# Patient Record
Sex: FEMALE | Race: WHITE | Employment: FULL TIME | URBAN - NONMETROPOLITAN AREA
[De-identification: names, ages, dates, MRNs, and addresses within clinical notes are randomized per-mention and may not be internally consistent; named-entity substitution may affect disease eponyms.]

---

## 2019-12-09 ENCOUNTER — OFFICE VISIT (OUTPATIENT)
Dept: CARDIOLOGY CLINIC | Age: 49
End: 2019-12-09
Payer: MEDICARE

## 2019-12-09 VITALS
HEART RATE: 78 BPM | DIASTOLIC BLOOD PRESSURE: 92 MMHG | SYSTOLIC BLOOD PRESSURE: 142 MMHG | RESPIRATION RATE: 16 BRPM | HEIGHT: 64 IN | BODY MASS INDEX: 35.85 KG/M2 | WEIGHT: 210 LBS

## 2019-12-09 DIAGNOSIS — R01.1 CARDIAC MURMUR: ICD-10-CM

## 2019-12-09 DIAGNOSIS — I35.1 NONRHEUMATIC AORTIC VALVE INSUFFICIENCY: Primary | ICD-10-CM

## 2019-12-09 DIAGNOSIS — R00.2 PALPITATIONS: ICD-10-CM

## 2019-12-09 PROCEDURE — G8427 DOCREV CUR MEDS BY ELIG CLIN: HCPCS | Performed by: INTERNAL MEDICINE

## 2019-12-09 PROCEDURE — 4004F PT TOBACCO SCREEN RCVD TLK: CPT | Performed by: INTERNAL MEDICINE

## 2019-12-09 PROCEDURE — G8484 FLU IMMUNIZE NO ADMIN: HCPCS | Performed by: INTERNAL MEDICINE

## 2019-12-09 PROCEDURE — 99214 OFFICE O/P EST MOD 30 MIN: CPT | Performed by: INTERNAL MEDICINE

## 2019-12-09 PROCEDURE — G8417 CALC BMI ABV UP PARAM F/U: HCPCS | Performed by: INTERNAL MEDICINE

## 2019-12-09 RX ORDER — HYDROCHLOROTHIAZIDE 25 MG/1
25 TABLET ORAL DAILY
Qty: 90 TABLET | Refills: 3 | Status: SHIPPED | OUTPATIENT
Start: 2019-12-09

## 2019-12-20 ENCOUNTER — TELEPHONE (OUTPATIENT)
Dept: CARDIOLOGY CLINIC | Age: 49
End: 2019-12-20

## 2022-01-21 ENCOUNTER — OFFICE VISIT (OUTPATIENT)
Dept: NEUROLOGY | Age: 52
End: 2022-01-21
Payer: MEDICARE

## 2022-01-21 VITALS
HEIGHT: 63 IN | SYSTOLIC BLOOD PRESSURE: 140 MMHG | HEART RATE: 82 BPM | DIASTOLIC BLOOD PRESSURE: 88 MMHG | WEIGHT: 218.25 LBS | BODY MASS INDEX: 38.67 KG/M2 | OXYGEN SATURATION: 96 %

## 2022-01-21 DIAGNOSIS — G50.0 TRIGEMINAL NEURALGIA OF LEFT SIDE OF FACE: Primary | ICD-10-CM

## 2022-01-21 DIAGNOSIS — R01.1 MURMUR, CARDIAC: ICD-10-CM

## 2022-01-21 DIAGNOSIS — I35.1 MILD AORTIC REGURGITATION: ICD-10-CM

## 2022-01-21 DIAGNOSIS — F17.200 SMOKER: ICD-10-CM

## 2022-01-21 DIAGNOSIS — E66.9 OBESITY (BMI 30-39.9): ICD-10-CM

## 2022-01-21 DIAGNOSIS — R00.2 PALPITATIONS: ICD-10-CM

## 2022-01-21 DIAGNOSIS — H81.09 MENIERE'S DISEASE, UNSPECIFIED LATERALITY: ICD-10-CM

## 2022-01-21 PROCEDURE — 99244 OFF/OP CNSLTJ NEW/EST MOD 40: CPT | Performed by: PSYCHIATRY & NEUROLOGY

## 2022-01-21 PROCEDURE — G8484 FLU IMMUNIZE NO ADMIN: HCPCS | Performed by: PSYCHIATRY & NEUROLOGY

## 2022-01-21 PROCEDURE — 99214 OFFICE O/P EST MOD 30 MIN: CPT | Performed by: PSYCHIATRY & NEUROLOGY

## 2022-01-21 PROCEDURE — G8417 CALC BMI ABV UP PARAM F/U: HCPCS | Performed by: PSYCHIATRY & NEUROLOGY

## 2022-01-21 PROCEDURE — G8427 DOCREV CUR MEDS BY ELIG CLIN: HCPCS | Performed by: PSYCHIATRY & NEUROLOGY

## 2022-01-21 RX ORDER — CARBAMAZEPINE 200 MG/1
CAPSULE, EXTENDED RELEASE ORAL
COMMUNITY
Start: 2021-11-15 | End: 2022-02-17 | Stop reason: SDUPTHER

## 2022-01-21 ASSESSMENT — ENCOUNTER SYMPTOMS
CHEST TIGHTNESS: 0
SHORTNESS OF BREATH: 0
COLOR CHANGE: 0
VOICE CHANGE: 0
CHOKING: 0
PHOTOPHOBIA: 0
BACK PAIN: 0
EYE REDNESS: 0
ABDOMINAL PAIN: 0
NAUSEA: 0
TROUBLE SWALLOWING: 0
DIARRHEA: 0
ABDOMINAL DISTENTION: 0
VISUAL CHANGE: 0
EYE DISCHARGE: 0
FACIAL SWELLING: 0
APNEA: 0
EYE PAIN: 0
WHEEZING: 0
VOMITING: 0
SINUS PRESSURE: 0
EYE ITCHING: 0
SORE THROAT: 0
CONSTIPATION: 0
BLOOD IN STOOL: 0
COUGH: 0

## 2022-01-21 NOTE — PROGRESS NOTES
[facility]  Neurology    [address] [address]  BVUXP:[phone]   Fax: [phone]        SUBJECTIVE:         PATIENT ID:  Mata Ruiz is a  RIGHT    HANDED 54 y.o. female. Neurologic Problem  The patient's pertinent negatives include no altered mental status, clumsiness, focal sensory loss, focal weakness, loss of balance, memory loss, near-syncope, slurred speech, syncope, visual change or weakness. Primary symptoms comment: LEFT   TRIGEMINAL  NEURALGIA    IN  MAXILLARY    AREA   SINCE    2016  . This is a chronic problem. The neurological problem developed gradually. The problem has been waxing and waning since onset. There was no focality noted. Associated symptoms include dizziness. Pertinent negatives include no abdominal pain, auditory change, aura, back pain, bladder incontinence, chest pain, confusion, diaphoresis, fatigue, fever, headaches, light-headedness, nausea, neck pain, palpitations, shortness of breath, vertigo or vomiting. Treatments tried: TEGRETAL  The treatment provided moderate relief. There is no history of a bleeding disorder, a clotting disorder, a CVA, dementia, head trauma, liver disease, mood changes or seizures. History obtained from  The   PATIENT         and other  available   medical  records   were  Also  reviewed. The  Duration,  Quality,  Severity,  Location,  Timing,  Context,  Modifying  Factors   Of   The   Chief   Complaint       And  Present  Illness  Was   Reviewed   In   Chronological   Manner.                                             PATIENT'S  MAIN  CONCERNS INCLUDE :                       1)     H/O     TRIGEMINAL  NEURALGIA     ON  LEFT     FACE                                       MAXILLARY  DIVISION      SINCE   2016                                      PREVIOUS    H/O    DENTAL    EXTRACTION    IN   2016                                   AND    ALSO   HAD   FOLLOW   UP 2)     NO     H/O   TRAUMA    TO   FACE /  HEAD  AND  NECK  . 3)     PATIENT      HAD   NEUROLOGY   EVALUATIONS      IN                                   [facility]         AND     BEING       ON                                       TEGRETOL    XR    400    MG   BID      SINCE     2017                        4)      HAD    MRI   EVALUATIONS     IN    SEPT. 2016                                AND   IN    OCT. 2020       WITH   AND  W/O   CONTRAST                                    SHOWED   NO  SIGNIFICANT   ABNORMALITIES                                   -    REPORTS     REVIEWED       ON      1/21/2022                          5)       PATIENT     HAS   CONTINUED      SYMPTOMS   OF                                 HYPERSENSITIVE    AND  BURNING     SENSATION                                    LEFT    LOWER   MAXILLARY   AREA                                      6)    H/O     CHRONIC  SMOKING                    PATIENT  AWARE  OF  RISKS  AND                 SIDE  EFFECTS  OF   SMOKING   DISCUSSED. PATIENT   ADVISED   AND  COUNSELED    TO   QUIT  SMOKING.                    7)       PREVIOUS     H/O   PALPITATIONS,    CARDIAC   MURMUR                               AND  MILD    AORTIC     REGURGITATION                                     -   BEING       FOLLOWED  BY    CARDIOLOGY                         8)      H/O     EPISODIC   VERTIGO      AND  MENIERE'S   DISEASE                                    HAD     ENT      EVALUATIONS                                        -   ON      HYDROCHLOROTHIAZIDE                                                  9)       OBESITY                        10)     PATIENT   WANTED    TO    ESTABLISH     WITH   LOCAL  NEUROLOGY                                  SERVICES                                            11)     IN  VIEW  OF  THE  PATIENT'S    MULTIPLE   NEUROLOGICAL                           SYMPTOMS  AND History  Of   Recent  TIA     absent             History  Of   Recent    Stroke     absent             Neck  Pain   and   Neck muscle  Spasms  absent               Radiating  down   And   Weakness           absent            Lower back   Pain  And     Spasms  absent              Radiating    Down   And   Weakness          absent                H/OFALLS        absent               History  Of   Visual  Symptoms    absent                  Associated   Diplopia       absent                                               Also   Additional   Symptoms   Present    As  Documented    In   The   detailed                  Review  Of  Systems   And    Please   Refer   To    Them for   Additional    Information. Any components  That are either  Unobtainable  Or  Limited  In   HPI, ROS  And/or PFSH   Are                   Due   ToPatient's  Medical  Problems,  Clinical  Condition   and/or lack of                                 other    Alternate   resources.             RECORDS   REVIEWED:    historical medical records           INFORMATION   REVIEWED:     MEDICAL   HISTORY,SURGICAL   HISTORY,     MEDICATIONS   LIST,   ALLERGIES AND  DRUG  INTOLERANCES,       FAMILY   HISTORY,  SOCIAL  HISTORY,      PROBLEM  LIST   FOR  PATIENT  CARE   COORDINATION          Past Medical History:   Diagnosis Date    Meniere's disease     Mild aortic regurgitation     with aortic insufficiency on echo 7/8/15    Murmur, cardiac     Palpitations     Temporomandibular joint disorder (TMJ)          Past Surgical History:   Procedure Laterality Date    TUBAL LIGATION           Current Outpatient Medications   Medication Sig Dispense Refill    carBAMazepine (CARBATROL) 200 MG extended release capsule take 2 capsules by mouth twice a day      hydrochlorothiazide (HYDRODIURIL) 25 MG tablet Take 1 tablet by mouth daily For Meniere's disease not htn 90 tablet 3    ibuprofen (ADVIL;MOTRIN) 200 MG tablet Take 200 mg by mouth as needed for Pain       No current facility-administered medications for this visit. No Known Allergies      History reviewed. No pertinent family history. Social History     Socioeconomic History    Marital status:      Spouse name: Not on file    Number of children: Not on file    Years of education: Not on file    Highest education level: Not on file   Occupational History    Not on file   Tobacco Use    Smoking status: Current Every Day Smoker     Packs/day: 1.00     Years: 9.00     Pack years: 9.00    Smokeless tobacco: Never Used   Substance and Sexual Activity    Alcohol use: Not Currently    Drug use: No    Sexual activity: Not on file   Other Topics Concern    Not on file   Social History Narrative    Not on file     Social Determinants of Health     Financial Resource Strain:     Difficulty of Paying Living Expenses: Not on file   Food Insecurity:     Worried About Running Out of Food in the Last Year: Not on file    Gonzalo of Food in the Last Year: Not on file   Transportation Needs:     Lack of Transportation (Medical): Not on file    Lack of Transportation (Non-Medical):  Not on file   Physical Activity:     Days of Exercise per Week: Not on file    Minutes of Exercise per Session: Not on file   Stress:     Feeling of Stress : Not on file   Social Connections:     Frequency of Communication with Friends and Family: Not on file    Frequency of Social Gatherings with Friends and Family: Not on file    Attends Caodaism Services: Not on file    Active Member of Clubs or Organizations: Not on file    Attends Club or Organization Meetings: Not on file    Marital Status: Not on file   Intimate Partner Violence:     Fear of Current or Ex-Partner: Not on file    Emotionally Abused: Not on file    Physically Abused: Not on file    Sexually Abused: Not on file   Housing Stability:     Unable to Pay for Housing in the Last Year: Not on file    Number of Places Lived in the Last Year: Not on file    Unstable Housing in the Last Year: Not on file       Vitals:    01/21/22 1345   BP: (!) 140/88   Pulse: 82   SpO2: 96%         Wt Readings from Last 3 Encounters:   01/21/22 218 lb 4 oz (99 kg)   12/09/19 210 lb (95.3 kg)   10/27/16 188 lb (85.3 kg)         BP Readings from Last 3 Encounters:   01/21/22 (!) 140/88   12/09/19 (!) 142/92   10/27/16 140/80           Hematology and Coagulation    No results found for: WBC, RBC, HGB, HCT, MCV, MCH, MCHC, RDW, PLT, MPV    No results found for: ESR    Chemistries    No results found for: NA, K, CL, CO2, BUN, CREATININE, CALCIUM, PROT, LABALBU, BILITOT, ALKPHOS, AST, ALT  No results found for: ALKPHOS, ALT, AST, PROT, BILITOT, BILIDIR, LABALBU  No results found for: BUN, CREATININE  No results found for: CALCIUM, MG  No results found for: AST, ALT  No components found for: IYMJX8E  No results found for: URICACID  No results found for: CKTOTAL  No results found for: [id number]      Drug Level    No results found for: PHENYTOIN, VALPROATE    No results found. Review of Systems   Constitutional: Negative for appetite change, chills, diaphoresis, fatigue, fever and unexpected weight change. HENT: Negative for congestion, dental problem, drooling, ear discharge, ear pain, facial swelling, hearing loss, mouth sores, nosebleeds, postnasal drip, sinus pressure, sore throat, tinnitus, trouble swallowing and voice change. Eyes: Negative for photophobia, pain, discharge, redness, itching and visual disturbance. Respiratory: Negative for apnea, cough, choking, chest tightness, shortness of breath and wheezing. Cardiovascular: Negative for chest pain, palpitations, leg swelling and near-syncope. Gastrointestinal: Negative for abdominal distention, abdominal pain, blood in stool, constipation, diarrhea, nausea and vomiting. Endocrine: Negative for cold intolerance, heat intolerance, polydipsia, polyphagia and polyuria. Genitourinary: Negative for bladder incontinence. Musculoskeletal: Negative for arthralgias, back pain, gait problem, joint swelling, myalgias, neck pain and neck stiffness. Skin: Negative for color change, pallor, rash and wound. Allergic/Immunologic: Negative for environmental allergies, food allergies and immunocompromised state. Neurological: Positive for dizziness. Negative for vertigo, tremors, focal weakness, seizures, syncope, facial asymmetry, speech difficulty, weakness, light-headedness, numbness, headaches and loss of balance. EPISODE  OF  VERTIGO. LEFT   TRIGEMINAL  NEURALGIA    Hematological: Negative for adenopathy. Does not bruise/bleed easily. Psychiatric/Behavioral: Negative for agitation, behavioral problems, confusion, decreased concentration, dysphoric mood, hallucinations, memory loss, self-injury, sleep disturbance and suicidal ideas. The patient is not nervous/anxious and is not hyperactive. OBJECTIVE:      Physical Exam  Constitutional:       Appearance: She is well-developed. HENT:      Head: Normocephalic and atraumatic. No raccoon eyes or Berumen's sign. Right Ear: External ear normal.      Left Ear: External ear normal.      Nose: Nose normal.   Eyes:      Conjunctiva/sclera: Conjunctivae normal.      Pupils: Pupils are equal, round, and reactive to light. Neck:      Thyroid: No thyroid mass or thyromegaly. Vascular: No carotid bruit. Trachea: No tracheal deviation. Meningeal: Brudzinski's sign and Kernig's sign absent. Cardiovascular:      Rate and Rhythm: Normal rate and regular rhythm. Pulmonary:      Effort: Pulmonary effort is normal.   Musculoskeletal:         General: No tenderness. Normal range of motion. Cervical back: Normal range of motion and neck supple. No rigidity. No muscular tenderness. Normal range of motion. Skin:     General: Skin is warm. Coloration: Skin is not pale.       Findings: No erythema or rash.      Nails: There is no clubbing. Psychiatric:         Attention and Perception: She is attentive. Mood and Affect: Mood is not anxious or depressed. Affect is not labile, blunt or inappropriate. Speech: She is communicative. Speech is not rapid and pressured, delayed, slurred or tangential.         Behavior: Behavior is not agitated, slowed, aggressive, withdrawn, hyperactive or combative. Behavior is cooperative. Thought Content: Thought content is not paranoid or delusional. Thought content does not include homicidal or suicidal ideation. Thought content does not include homicidal or suicidal plan. Cognition and Memory: Memory is not impaired. She does not exhibit impaired recent memory or impaired remote memory. Judgment: Judgment is not impulsive or inappropriate. NEUROLOGICALEXAMINATION :       A) MENTAL STATUS:                   Alert and  oriented  To time, place  And  Person. No Aphasia. No  Dysarthria. Able   To  Follow     SIMPLE    commands   without   Any  Difficulty. No right  To left confusion. Normal  Speech  And language function. Insight and  Judgment ,Fund  Of  Knowledge   within normal limits                Recent  And  Remote memory  within   normal limits                Attention &  Concentration are within   normal limits                                                   B) CRANIAL NERVES :        CN : Visual  Acuity  And  Visual fields  within normal limits               Fundi  Could  Not  Be  Could  Not  Be  Evaluated. 3,4,6 CN : Both  Pupils are   Reactive and  Equal.  Movements  Are  Intact. No  Nystagmus. No  HAZEL. No  Afferent  Pupillary  Defect noted. 5 CN :  Normal  Facial sensations and Corneal  Reflexes           7 CN:  Normal  Facial  Symmetry  And  Strength. No facial  Weakness. HYPERSENSITIVITY   ON  LEFT  SIDE  OF  FACE    IN  MAXILLARY  AREA           8 CN :  Hearing  Appears within normal limits          9, 10 CN: Normal   spontaneous, reflex   palate   movements         11 CN:   Normal  Shoulder  shrug and  strength         12 CN :   Normal  Tongue movements and  Tongue  In midline                        No tongue   Fasciculations or atrophy       C) MOTOR  EXAM:                 Strength  In upper  And  Lower   extremities   within   normal limits               No  Drift. No  Atrophy               Rapid   alternating  And  repetitions  Movements  within   normal limits                 Muscle  Tone  In upper  And  Lower  Extremities  normal                No rigidity. No  Spasticity. Bradykinesia   absent                 No  Asterixis. Sustention  Tremor , Resting   Tremor   absent                    No   other  Abnormal  Movements noted           D) SENSORY :               Light   touch, pinprick,   position  And  Vibration  within normal limits        E) REFLEXES:                   Deep  Tendon  Reflexes   normal                  No  pathological  Reflexes  Bilaterally. F) COORDINATION  AND  GAIT :                                Station and  Gait  normal                              Romberg 's test negative                          Ataxia negative          ASSESSMENT:        Patient Active Problem List   Diagnosis    Murmur, cardiac    Mild aortic regurgitation    Palpitations    Meniere's disease    Temporomandibular joint disorder (TMJ)    Trigeminal neuralgia of left side of face    Smoker           VISITING DIAGNOSIS:      ICD-10-CM    1.  Trigeminal neuralgia of left side of face  G50.0 Sedimentation Rate     CBC     GORGE Screen with Reflex     Rheumatoid Factor     Comprehensive Metabolic Panel     Carbamazepine Level, Total     Lupus Anticoagulant     TSH     Vitamin B12 & Folate     T. pallidum Ab     MRI BRAIN W WO CONTRAST     XR CHEST STANDARD (2 VW)   2. Murmur, cardiac  R01.1 Sedimentation Rate     CBC     GORGE Screen with Reflex     Rheumatoid Factor     Comprehensive Metabolic Panel     Carbamazepine Level, Total     Lupus Anticoagulant     TSH     Vitamin B12 & Folate     T. pallidum Ab     MRI BRAIN W WO CONTRAST     XR CHEST STANDARD (2 VW)   3. Mild aortic regurgitation  I35.1 Sedimentation Rate     CBC     GORGE Screen with Reflex     Rheumatoid Factor     Comprehensive Metabolic Panel     Carbamazepine Level, Total     Lupus Anticoagulant     TSH     Vitamin B12 & Folate     T. pallidum Ab     MRI BRAIN W WO CONTRAST     XR CHEST STANDARD (2 VW)   4. Meniere's disease, unspecified laterality  H81.09 Sedimentation Rate     CBC     GORGE Screen with Reflex     Rheumatoid Factor     Comprehensive Metabolic Panel     Carbamazepine Level, Total     Lupus Anticoagulant     TSH     Vitamin B12 & Folate     T. pallidum Ab     MRI BRAIN W WO CONTRAST     XR CHEST STANDARD (2 VW)   5. Palpitations  R00.2 Sedimentation Rate     CBC     GORGE Screen with Reflex     Rheumatoid Factor     Comprehensive Metabolic Panel     Carbamazepine Level, Total     Lupus Anticoagulant     TSH     Vitamin B12 & Folate     T. pallidum Ab     MRI BRAIN W WO CONTRAST     XR CHEST STANDARD (2 VW)   6. Smoker  F17.200                         VARIOUS  RISK   FACTORS   WERE  REVIEWED   AND   DISCUSSED. PLAN:                         Sara Lamb  Of  The  Diagnoses,  The  Management & Treatment  Options            AND    Care  plan  Were          Reviewed and   Discussed   With  patient. * Goals  And  Expectations  Of  The  Therapy  Discussed   And  Reviewed. *   Benefits   And   Side  Effect  Profile  Of  Medication/s   Were   Discussed                * Need   For  Further   Follow up For  The  Various  Problems Were  discussed.           * Results  Of  The  Previous  Diagnostic tests were reviewed and discussed                   Medical  Decision  Making  Was  Made  Based on the   Complexity  Of  Above  Mentioned  Diagnoses,    Data reviewed             And    Risk  Of  Significant   Co morbidities and   complicating   Factors. Medical  Decision  Was    Moderate   Complexity   Due   To  The  Patient's  Multiple  Symptoms  &  Disease,            Complex  Treatment  Regimen,  Multiple medications           and   Risk  Of   Side  Effects,  Difficulty  In  Medication  Management  And  Diagnostic  Challenges           In  View  Of  The  Associated   Co  Morbid  Conditions   And  Problems. *   ADEQUATE   FLUID  INTAKE   AND  ELECTROLYTE  BALANCE           * KEEP  DAIRY  OF   THE  NEUROLOGICAL  SYMPTOMS        RECORDING THE    DURATION  AND  FREQUENCY. *  AVOID    CONDITIONS  AND  FACTORS   THAT  MAKE                  NEUROLOGICAL  SYMPTOMS  WORSE.                    *TO  MAINTAIN  REGULAR  SLEEP  WAKE  CYCLES. *   TO  HAVE  ADEQUATE  REST  AND   SLEEP    HOURS.          *    TO   AVOID   TO  SLEEP  IN   SUPINE  POSITION. *      WEIGHT   LOSS. *    AVOID  ANY USAGE OF    TOBACCO,          AVOID  EXCESSIVE  ALCOHOL  AND   ILLEGAL   SUBSTANCES          *  CONTINUE   MEDICATIONS    PRESCRIBED       AS    RECOMMENDED       *   Compliance   With  Medications   And  Instructions          * CURRENTLY    TOLERATING  THE  PRESCRIBED   MEDICATIONS. WITHOUT  ANY  SIGNIFICANT  SIDE  EFFECTS   &  GETTING BENEFIT.         *    Antiplatelet  therapy    As   Recommended  Was   Discussed      *    Prophylactic  Use   Of     Vitamin   B   Complex,  Folic  Acid,    Vitamin  B12    Multivitamin,       Calcium  With  magnesium  And  Vit D    Supplementations   Over  The  Counter  Discussed                *  EVALUATIONS  AND  FOLLOW UP:                       *CARDIOLOGY                 *   DENTAL                     * ENT *   IN  VIEW  OF  THE  PATIENT'S    MULTIPLE   NEUROLOGICAL                           SYMPTOMS  AND  CONCERNS    FOR  PROLONGED   DURATION,                           AND    MULTIPLE   CO MORBID  MEDICAL  CONDITIONS,                           PATIENT    NEEDS  NEURO  DIAGNOSTIC  EVALUATIONS                      FOR   ANY   NEUROLOGICAL  PATHOLOGIES    AND  OTHER                        CORRECTABLE   ETIOLOGIES;     AND                              PATIENT  REQUESTS   THE  SAME. Orders Placed This Encounter   Procedures    MRI BRAIN W WO CONTRAST    XR CHEST STANDARD (2 VW)    Sedimentation Rate    CBC    GORGE Screen with Reflex    Rheumatoid Factor    Comprehensive Metabolic Panel    Carbamazepine Level, Total    Lupus Anticoagulant    TSH    Vitamin B12 & Folate    T. pallidum Ab                           *  PATIENT  TO  RETURN  THE  CLINIC  AFTER   ABOVE,                       FOR   FURTHER    RE EVALUATIONS                               AND  ADDITIONAL  RECOMMENDATIONS               *PATIENT   TO  FOLLOW  UP  WITH   PRIMARY  CARE         OTHER  CONSULTANTS  AS  BEFORE.               *TO  FOLLOW  WITH   MENTAL  HEALTH  PROFESSIONALS ,  INCLUDING            PSYCHOLOGICAL  COUNSELING   AND  PSYCHIATRIC  EVALUTIONS,                     *  Maintain   Healthy  Life Style    With   Periodic  Monitoring  Of          Any  Medical  Conditions  Including   Elevated  Blood  Pressure,  Lipid  Profile,        Blood  Sugar levels  AndHeart  Disease. *   Period   Screening  For  Cancers  Involving  Breast,  Colon,         Lungs  And  Other  Organs  As  Applicable,  In consultation   With  Your  Primary Care Providers. *Second  Neurological  Opinion  And  Evaluations  In  [facility]  Setting  If  Patient  Is  Interested.                  * Please   Contact   Neurology  Clinic   Early   If   Are  Any  New  Neurological   And  Any neurological  Concerns. *  If  The  Patient remains  Neurologically  Stable   Return   To  [facility]rology Department   IN      1-2   MONTHS  TIME   FOR  FURTHER              FOLLOW UP.                       *   The  Neurological   Findings,  Possible  Diagnosis,  Differential diagnoses                    And  Options  For    Further   Investigations                   And  management   Are  Discussed  Comprehensively. Medications   And  Prescription   Risks  And  Side effects  Are   Also  Discussed. *  If   There is  Any  Significant  Worsening   Of  Current  Symptoms  And  Or                  If patient  Develops   Any additional  New  NeurologicalSymptoms                  Or  Significant  Concerns   Should  Call  911 or  Go  To  Emergency  Department                  For  Further  Immediate  Evaluation and  management . The   Above  Were  Reviewed  With  PATIENT   and                          questions  Answered  In  Detail. TOTAL   TIME     SPENT :               On this date 1/21/2022 I have spent  60  minutes     reviewing previous notes, test results               and face to face time with the patient including     discussing the diagnosis and importance of compliance               with the treatment plan  as well as documenting on the day of the visit. Electronically signed by   Gayatri Hawkins M.D., [facility]. Board Certified in  Neurology &  In  [employer] [employer] and [employer] ([employer])      DISCLAIMER:   Although every effort was made to ensure the accuracy of this  electronictranscription, some errors in transcription may have occurred.       GENERAL PATIENT INSTRUCTIONS:      A Healthy Lifestyle: Care Instructions   Your Care Instructions   A healthy lifestyle can help you feel good, stay at ahealthy weight, and have plenty of energy for both work and play. A healthy lifestyle is something you can share with your whole family.  A healthy lifestyle also can lower your risk for serious health problems, such ashigh blood pressure, heart disease, and diabetes.  You can follow a few steps listed below to improve your health and the health of your family.  Follow-up careis a key part of your treatment and safety. Be sure to make and go to all appointments, and call your doctor if you are having problems. Its also a good idea to know your test results and keep a list of the medicines you take.  How can you care for yourself at home?  Do not eat too much sugar, fat, or fast foods. You can still have dessert and treats nowand then. The goal is moderation.  Start small to improve your eating habits. Pay attention to portion sizes, drink less juice and soda pop, and eat more fruits and vegetables.  Eat a healthy amount of food. A 3-ounce serving of meat, for example, is about the size of a deck of cards. Fill the rest of your plate with vegetables and whole grains.  Limit theamount of soda and sports drinks you have every day. Drink more water when you are thirsty.  Eat at least 5 servings of fruits and vegetables every day. It may seem like a lot, but it is not hard to reach this goal. Aserving or helping is 1 piece of fruit, 1 cup of vegetables, or 2 cups of leafy, raw vegetables. Have an apple or some carrot sticks as an afternoon snack instead of a candy bar. Try to have fruits and/or vegetables at everymeal.   Make exercise part of your daily routine. You may want to start with simple activities, such as walking, bicycling, or slow swimming. Try shon active 30 to 60 minutes every day. You do not need to do all 30 to 60 minutes all at once. For example, you can exercise 3 times a day for 10 or 20 minutes.  Moderate exercise is safe for most people, click on the \"Sign Up Now\" link.  Current as of: July 26, 2016   Content Version: 11.2   © 6439-1003 [employer]. Care instructions adapted under license by [facility] ([facility]). If you have questions about a medical condition or this instruction, always ask your healthcare professional. [employer] disclaims any warranty or liability for your use of this information.

## 2022-01-21 NOTE — PROGRESS NOTES
This writer contacted scheduling to schedule the following testing: MRI brain w and wo contrast - patient made aware, will contact office with further needs.

## 2022-02-01 ENCOUNTER — HOSPITAL ENCOUNTER (OUTPATIENT)
Dept: MRI IMAGING | Age: 52
Discharge: HOME | End: 2022-02-03
Payer: MEDICARE

## 2022-02-01 ENCOUNTER — HOSPITAL ENCOUNTER (OUTPATIENT)
Dept: LAB | Age: 52
Discharge: HOME | End: 2022-02-01
Payer: MEDICARE

## 2022-02-01 DIAGNOSIS — I35.1 MILD AORTIC REGURGITATION: ICD-10-CM

## 2022-02-01 DIAGNOSIS — R01.1 MURMUR, CARDIAC: ICD-10-CM

## 2022-02-01 DIAGNOSIS — R00.2 PALPITATIONS: ICD-10-CM

## 2022-02-01 DIAGNOSIS — G50.0 TRIGEMINAL NEURALGIA OF LEFT SIDE OF FACE: ICD-10-CM

## 2022-02-01 DIAGNOSIS — H81.09 MENIERE'S DISEASE, UNSPECIFIED LATERALITY: ICD-10-CM

## 2022-02-01 LAB
ALBUMIN SERPL-MCNC: 3.9 G/DL (ref 3.5–5.2)
ALBUMIN/GLOBULIN RATIO: 1.4 (ref 1–2.5)
ALP BLD-CCNC: 75 U/L (ref 35–104)
ALT SERPL-CCNC: 17 U/L (ref 5–33)
ANION GAP SERPL CALCULATED.3IONS-SCNC: 12 MMOL/L (ref 9–17)
AST SERPL-CCNC: 15 U/L
BILIRUB SERPL-MCNC: 0.2 MG/DL (ref 0.3–1.2)
BUN BLDV-MCNC: 13 MG/DL (ref 6–20)
BUN/CREAT BLD: 19 (ref 9–20)
CALCIUM SERPL-MCNC: 8.8 MG/DL (ref 8.6–10.4)
CHLORIDE BLD-SCNC: 104 MMOL/L (ref 98–107)
CO2: 24 MMOL/L (ref 20–31)
CREAT SERPL-MCNC: 0.7 MG/DL (ref 0.5–0.9)
GFR AFRICAN AMERICAN: >60 ML/MIN
GFR NON-AFRICAN AMERICAN: >60 ML/MIN
GFR SERPL CREATININE-BSD FRML MDRD: ABNORMAL ML/MIN/{1.73_M2}
GFR SERPL CREATININE-BSD FRML MDRD: ABNORMAL ML/MIN/{1.73_M2}
GLUCOSE BLD-MCNC: 96 MG/DL (ref 70–99)
HCT VFR BLD CALC: 37.9 % (ref 36.3–47.1)
HEMOGLOBIN: 12.9 G/DL (ref 11.9–15.1)
MCH RBC QN AUTO: 31.5 PG (ref 25.2–33.5)
MCHC RBC AUTO-ENTMCNC: 34 G/DL (ref 25.2–33.5)
MCV RBC AUTO: 92.4 FL (ref 82.6–102.9)
NRBC AUTOMATED: 0 PER 100 WBC
PDW BLD-RTO: 12.5 % (ref 11.8–14.4)
PLATELET # BLD: 241 K/UL (ref 138–453)
PMV BLD AUTO: 9.8 FL (ref 8.1–13.5)
POTASSIUM SERPL-SCNC: 4.2 MMOL/L (ref 3.7–5.3)
RBC # BLD: 4.1 M/UL (ref 3.95–5.11)
SEDIMENTATION RATE, ERYTHROCYTE: 19 MM (ref 0–20)
SODIUM BLD-SCNC: 140 MMOL/L (ref 135–144)
TOTAL PROTEIN: 6.7 G/DL (ref 6.4–8.3)
TSH SERPL DL<=0.05 MIU/L-ACNC: 1.76 MIU/L (ref 0.3–5)
WBC # BLD: 8.3 K/UL (ref 3.5–11.3)

## 2022-02-01 PROCEDURE — 86147 CARDIOLIPIN ANTIBODY EA IG: CPT

## 2022-02-01 PROCEDURE — 85027 COMPLETE CBC AUTOMATED: CPT

## 2022-02-01 PROCEDURE — 82746 ASSAY OF FOLIC ACID SERUM: CPT

## 2022-02-01 PROCEDURE — 86038 ANTINUCLEAR ANTIBODIES: CPT

## 2022-02-01 PROCEDURE — 6360000004 HC RX CONTRAST MEDICATION: Performed by: PSYCHIATRY & NEUROLOGY

## 2022-02-01 PROCEDURE — 86225 DNA ANTIBODY NATIVE: CPT

## 2022-02-01 PROCEDURE — A9579 GAD-BASE MR CONTRAST NOS,1ML: HCPCS | Performed by: PSYCHIATRY & NEUROLOGY

## 2022-02-01 PROCEDURE — 86780 TREPONEMA PALLIDUM: CPT

## 2022-02-01 PROCEDURE — 85610 PROTHROMBIN TIME: CPT

## 2022-02-01 PROCEDURE — 84443 ASSAY THYROID STIM HORMONE: CPT

## 2022-02-01 PROCEDURE — 80053 COMPREHEN METABOLIC PANEL: CPT

## 2022-02-01 PROCEDURE — 85652 RBC SED RATE AUTOMATED: CPT

## 2022-02-01 PROCEDURE — 36415 COLL VENOUS BLD VENIPUNCTURE: CPT

## 2022-02-01 PROCEDURE — 80156 ASSAY CARBAMAZEPINE TOTAL: CPT

## 2022-02-01 PROCEDURE — 70553 MRI BRAIN STEM W/O & W/DYE: CPT

## 2022-02-01 PROCEDURE — 85730 THROMBOPLASTIN TIME PARTIAL: CPT

## 2022-02-01 PROCEDURE — 82607 VITAMIN B-12: CPT

## 2022-02-01 PROCEDURE — 86431 RHEUMATOID FACTOR QUANT: CPT

## 2022-02-01 PROCEDURE — 85613 RUSSELL VIPER VENOM DILUTED: CPT

## 2022-02-01 RX ADMIN — GADOTERIDOL 10 ML: 279.3 INJECTION, SOLUTION INTRAVENOUS at 14:00

## 2022-02-02 LAB
CARBAMAZEPINE DATE LAST DOSE: NORMAL
CARBAMAZEPINE DOSE AMOUNT: NORMAL
CARBAMAZEPINE DOSE TIME: NORMAL
CARBAMAZEPINE LEVEL: 8 UG/ML (ref 4–12)
FOLATE: 9.8 NG/ML
RHEUMATOID FACTOR: <10 IU/ML
T. PALLIDUM, IGG: NONREACTIVE
VITAMIN B-12: 310 PG/ML (ref 232–1245)

## 2022-02-04 LAB
ANTI DNA DOUBLE STRANDED: 0.9 IU/ML
ANTI-NUCLEAR ANTIBODY (ANA): NEGATIVE
ENA ANTIBODIES SCREEN: 0.2 U/ML

## 2022-02-08 LAB
ANTICARDIOLIPIN IGA ANTIBODY: 5.3 APL (ref 0–14)
ANTICARDIOLIPIN IGG ANTIBODY: <0.5 GPL (ref 0–10)
CARDIOLIPIN AB IGM: 3.3 MPL (ref 0–10)
DILUTE RUSSELL VIPER VENOM TIME: NORMAL
INR BLD: 0.9
LUPUS ANTICOAG: NORMAL
PARTIAL THROMBOPLASTIN TIME: 24 SEC (ref 20.5–30.5)
PROTHROMBIN TIME: 10.2 SEC (ref 9.1–12.3)

## 2022-02-17 ENCOUNTER — OFFICE VISIT (OUTPATIENT)
Dept: NEUROLOGY | Age: 52
End: 2022-02-17
Payer: MEDICARE

## 2022-02-17 VITALS
SYSTOLIC BLOOD PRESSURE: 130 MMHG | OXYGEN SATURATION: 98 % | DIASTOLIC BLOOD PRESSURE: 88 MMHG | BODY MASS INDEX: 38.67 KG/M2 | HEIGHT: 63 IN | WEIGHT: 218.25 LBS | HEART RATE: 72 BPM

## 2022-02-17 DIAGNOSIS — G50.0 TRIGEMINAL NEURALGIA OF LEFT SIDE OF FACE: Primary | ICD-10-CM

## 2022-02-17 DIAGNOSIS — H81.09 MENIERE'S DISEASE, UNSPECIFIED LATERALITY: ICD-10-CM

## 2022-02-17 DIAGNOSIS — F17.200 SMOKER: ICD-10-CM

## 2022-02-17 DIAGNOSIS — I35.1 MILD AORTIC REGURGITATION: ICD-10-CM

## 2022-02-17 DIAGNOSIS — E66.9 OBESITY (BMI 30-39.9): ICD-10-CM

## 2022-02-17 DIAGNOSIS — J32.8 OTHER CHRONIC SINUSITIS: ICD-10-CM

## 2022-02-17 PROCEDURE — G8417 CALC BMI ABV UP PARAM F/U: HCPCS | Performed by: PSYCHIATRY & NEUROLOGY

## 2022-02-17 PROCEDURE — 99212 OFFICE O/P EST SF 10 MIN: CPT | Performed by: PSYCHIATRY & NEUROLOGY

## 2022-02-17 PROCEDURE — 4004F PT TOBACCO SCREEN RCVD TLK: CPT | Performed by: PSYCHIATRY & NEUROLOGY

## 2022-02-17 PROCEDURE — 99214 OFFICE O/P EST MOD 30 MIN: CPT | Performed by: PSYCHIATRY & NEUROLOGY

## 2022-02-17 PROCEDURE — G8484 FLU IMMUNIZE NO ADMIN: HCPCS | Performed by: PSYCHIATRY & NEUROLOGY

## 2022-02-17 PROCEDURE — G8427 DOCREV CUR MEDS BY ELIG CLIN: HCPCS | Performed by: PSYCHIATRY & NEUROLOGY

## 2022-02-17 RX ORDER — CARBAMAZEPINE 200 MG/1
CAPSULE, EXTENDED RELEASE ORAL
Qty: 60 CAPSULE | Refills: 2 | Status: SHIPPED | OUTPATIENT
Start: 2022-02-17 | End: 2022-05-03 | Stop reason: SDUPTHER

## 2022-02-17 RX ORDER — GABAPENTIN 300 MG/1
CAPSULE ORAL
Qty: 60 CAPSULE | Refills: 1 | Status: SHIPPED | OUTPATIENT
Start: 2022-02-17 | End: 2022-05-12 | Stop reason: SDUPTHER

## 2022-02-17 ASSESSMENT — ENCOUNTER SYMPTOMS
VISUAL CHANGE: 0
ABDOMINAL DISTENTION: 0
APNEA: 0
CONSTIPATION: 0
FACIAL SWELLING: 0
SINUS PRESSURE: 0
WHEEZING: 0
CHEST TIGHTNESS: 0
EYE REDNESS: 0
EYE ITCHING: 0
CHOKING: 0
TROUBLE SWALLOWING: 0
BACK PAIN: 0
COLOR CHANGE: 0
BLOOD IN STOOL: 0
COUGH: 0
EYE PAIN: 0
DIARRHEA: 0
EYE DISCHARGE: 0
VOMITING: 0
ABDOMINAL PAIN: 0
PHOTOPHOBIA: 0
NAUSEA: 0
SHORTNESS OF BREATH: 0
VOICE CHANGE: 0
SORE THROAT: 0

## 2022-02-17 NOTE — LETTER
[address] Neurology A department of [facility]  Phone: [phone]  Fax: [phone]    Billy Willingham MD    February 17, 2022     James Perry  [address] [place] [address]    Patient: Bev Taylor   MR Number: [id number]   YOB: 1970   Date of Visit: 2/17/2022       Dear James Perry: Thank you for referring Bev Taylor to me for evaluation/treatment. Below are the relevant portions of my assessment and plan of care. If you have questions, please do not hesitate to call me. I look forward to following Lance Davies along with you.     Sincerely,      Billy Willingham MD

## 2022-02-17 NOTE — PROGRESS NOTES
[facility]  Neurology    [address]IZJ:[phone]   Fax: [phone]        SUBJECTIVE:         PATIENT ID:  Mauricio Marques is a  RIGHT    HANDED 54 y.o. female. Neurologic Problem  The patient's pertinent negatives include no altered mental status, clumsiness, focal sensory loss, focal weakness, loss of balance, memory loss, near-syncope, slurred speech, syncope, visual change or weakness. Primary symptoms comment: LEFT   TRIGEMINAL  NEURALGIA    IN  MAXILLARY    AREA   SINCE    2016  . This is a chronic problem. The neurological problem developed gradually. The problem has been waxing and waning since onset. There was no focality noted. Associated symptoms include dizziness. Pertinent negatives include no abdominal pain, auditory change, aura, back pain, bladder incontinence, chest pain, confusion, diaphoresis, fatigue, fever, headaches, light-headedness, nausea, neck pain, palpitations, shortness of breath, vertigo or vomiting. Treatments tried: TEGRETAL  The treatment provided moderate relief. There is no history of a bleeding disorder, a clotting disorder, a CVA, dementia, head trauma, liver disease, mood changes or seizures. History obtained from  The   PATIENT         and other  available   medical  records   were  Also  reviewed. The  Duration,  Quality,  Severity,  Location,  Timing,  Context,  Modifying  Factors   Of   The   Chief   Complaint       And  Present  Illness  Was   Reviewed   In   Chronological   Manner.                                             PATIENT'S  MAIN  CONCERNS INCLUDE :                       1)     H/O     TRIGEMINAL  NEURALGIA     ON  LEFT     FACE                                            MAXILLARY  DIVISION      SINCE   2016                                      PREVIOUS    H/O    DENTAL    EXTRACTION    IN   2016                                   AND    ALSO   HAD   FOLLOW   UP 2)     NO     H/O   TRAUMA    TO   FACE /  HEAD  AND  NECK  . 3)     PATIENT      HAD   NEUROLOGY   EVALUATIONS      IN                                   [place]  [facility]         AND     BEING       ON                                       TEGRETOL    XR    400    MG   BID      SINCE     2017                        4)      HAD    MRI   EVALUATIONS     IN    SEPT. 2016                                AND   IN    OCT. 2020       WITH   AND  W/O   CONTRAST                                    SHOWED   NO  SIGNIFICANT   ABNORMALITIES                                     -    REPORTS     REVIEWED       ON      1/21/2022                          5)       PATIENT     HAS   CONTINUED      SYMPTOMS   OF                                 HYPERSENSITIVE    AND  BURNING     SENSATION                                    LEFT    LOWER   MAXILLARY   AREA                                      6)    H/O     CHRONIC  SMOKING                    PATIENT  AWARE  OF  RISKS  AND                 SIDE  EFFECTS  OF   SMOKING   DISCUSSED. PATIENT   ADVISED   AND  COUNSELED    TO   QUIT  SMOKING.                    7)       PREVIOUS     H/O   PALPITATIONS,    CARDIAC   MURMUR                               AND  MILD    AORTIC     REGURGITATION                                     -   BEING       FOLLOWED  BY    CARDIOLOGY                         8)      H/O     EPISODIC   VERTIGO      AND  MENIERE'S   DISEASE                                    HAD     ENT      EVALUATIONS                                        -   ON      HYDROCHLOROTHIAZIDE                                                       9)   H/O     OBESITY                                             10)      PATIENT      HAD    MRI  BRAIN IN  FEB. 2022    SHOWED                                   A)     UN  REMARKABLE    MRI  BRETT   AND  IACs                               B)    Scattered   Paranasal  Sinus  Disease And                                           Trace  Bilateral   Mastoid  Effusions                              LABS    INCLUDING     TEGRETOL   LEVELS                                      ARE    WITH  IN  NORMAL  LIMITS                              -   RESULTS   REVIEWED    WITH  PATIENT  IN  DETAIL                          11)       RECOMMENDED  :                                    A)     TO  CONTINUE   TEGRETOL   400  MG  BID                                B)    ADD   NEURONTIN   300   MG    EVENING  AND  BEDTIME  AS NEEDED                                          FOR  SYMPTOMATIC    RELIEF                                 C)    FOLLOW UP   WITH  PCP    AND  ENT                                       VARIOUS  RISK   FACTORS   WERE  REVIEWED   AND   DISCUSSED.                                                   PRECIPITATING  FACTORS: including  fever/infection, exertion/relaxation, position change, stress,                weather change,   medications/alcohol, time of day/darkness/light  Are  absent                                                          MODIFYING  FACTORS:  fever/infection, exertion/relaxation, position change, stress, weather change,               medications/alcohol, time of day/darkness/light  Are  absent                Patient   Indicates   The  Presence   And  The  Absence  Of  The  Following    Associated  And             Additional  Neurological    Symptoms:                                Balance  And coordination   problems  absent           Gait problems     absent            Headaches      absent              Migraines           absent           Memory problemsabsent              Confusion        absent            Paresthesia   numbness          present           Seizures  And  Starring  Episodes           absent           Syncope,  Near  syncopal episodes         absent           Speech   problems           absent             Swallowing   Problems      absent            Dizziness,  Light headedness           absent              Vertigo        absent             Generalized   Weakness    absent              focal  Weakness     absent             Tremors         absent              Sleep  Problems     absent             History  Of   Recent  Head  Injury     absent             History  Of   Recent  TIA     absent             History  Of   Recent    Stroke     absent             Neck  Pain   and   Neck muscle  Spasms  absent               Radiating  down   And   Weakness           absent            Lower back   Pain  And     Spasms  absent              Radiating    Down   And   Weakness          absent                H/OFALLS        absent               History  Of   Visual  Symptoms    absent                  Associated   Diplopia       absent                                               Also   Additional   Symptoms   Present    As  Documented    In   The   detailed                  Review  Of  Systems   And    Please   Refer   To    Them for   Additional    Information. Any components  That are either  Unobtainable  Or  Limited  In   HPI, ROS  And/or PFSH   Are                   Due   ToPatient's  Medical  Problems,  Clinical  Condition   and/or lack of                                 other    Alternate   resources.             RECORDS   REVIEWED:    historical medical records           INFORMATION   REVIEWED:     MEDICAL   HISTORY,SURGICAL   HISTORY,     MEDICATIONS   LIST,   ALLERGIES AND  DRUG  INTOLERANCES,       FAMILY   HISTORY,  SOCIAL  HISTORY,      PROBLEM  LIST   FOR  PATIENT  CARE   COORDINATION          Past Medical History:   Diagnosis Date    Meniere's disease     Mild aortic regurgitation     with aortic insufficiency on echo 7/8/15    Murmur, cardiac     Palpitations     Temporomandibular joint disorder (TMJ)          Past Surgical History:   Procedure Laterality Date    TUBAL LIGATION           Current Outpatient Medications   Medication Sig Dispense Refill  gabapentin (NEURONTIN) 300 MG capsule TAKE   ONE    CAPSULE     IN   THE  EVENING  AND      AT  BED   TIME   AS  NEEDED 60 capsule 1    carBAMazepine (CARBATROL) 200 MG extended release capsule take 2 capsules by mouth twice a day 60 capsule 2    hydrochlorothiazide (HYDRODIURIL) 25 MG tablet Take 1 tablet by mouth daily For Meniere's disease not htn 90 tablet 3    ibuprofen (ADVIL;MOTRIN) 200 MG tablet Take 200 mg by mouth as needed for Pain       No current facility-administered medications for this visit. No Known Allergies      History reviewed. No pertinent family history. Social History     Socioeconomic History    Marital status:      Spouse name: Not on file    Number of children: Not on file    Years of education: Not on file    Highest education level: Not on file   Occupational History    Not on file   Tobacco Use    Smoking status: Current Every Day Smoker     Packs/day: 1.00     Years: 9.00     Pack years: 9.00    Smokeless tobacco: Never Used   Substance and Sexual Activity    Alcohol use: Not Currently    Drug use: No    Sexual activity: Not on file   Other Topics Concern    Not on file   Social History Narrative    Not on file     Social Determinants of Health     Financial Resource Strain:     Difficulty of Paying Living Expenses: Not on file   Food Insecurity:     Worried About Running Out of Food in the Last Year: Not on file    Gonzalo of Food in the Last Year: Not on file   Transportation Needs:     Lack of Transportation (Medical): Not on file    Lack of Transportation (Non-Medical):  Not on file   Physical Activity:     Days of Exercise per Week: Not on file    Minutes of Exercise per Session: Not on file   Stress:     Feeling of Stress : Not on file   Social Connections:     Frequency of Communication with Friends and Family: Not on file    Frequency of Social Gatherings with Friends and Family: Not on file    Attends [employer] Services: Not on file   [facility] Diurnal or Organizations: Not on file    Attends Club or Organization Meetings: Not on file    Marital Status: Not on file   Intimate Partner Violence:     Fear of Current or Ex-Partner: Not on file    Emotionally Abused: Not on file    Physically Abused: Not on file    Sexually Abused: Not on file   Housing Stability:     Unable to Pay for Housing in the Last Year: Not on file    Number of Places Lived in the Last Year: Not on file    Unstable Housing in the Last Year: Not on file       Vitals:    02/17/22 1152   BP: 130/88   Pulse: 72   SpO2: 98%         Wt Readings from Last 3 Encounters:   02/17/22 218 lb 4 oz (99 kg)   01/21/22 218 lb 4 oz (99 kg)   12/09/19 210 lb (95.3 kg)         BP Readings from Last 3 Encounters:   02/17/22 130/88   01/21/22 (!) 140/88   12/09/19 (!) 142/92           Hematology and Coagulation    Lab Results   Component Value Date    WBC 8.3 02/01/2022    RBC 4.10 02/01/2022    HGB 12.9 02/01/2022    HCT 37.9 02/01/2022    MCV 92.4 02/01/2022    MCH 31.5 02/01/2022    MCHC 34.0 02/01/2022    RDW 12.5 02/01/2022     02/01/2022    MPV 9.8 02/01/2022       No results found for: ESR    Chemistries    Lab Results   Component Value Date     02/01/2022    K 4.2 02/01/2022     02/01/2022    CO2 24 02/01/2022    BUN 13 02/01/2022    CREATININE 0.70 02/01/2022    CALCIUM 8.8 02/01/2022    PROT 6.7 02/01/2022    LABALBU 3.9 02/01/2022    BILITOT 0.20 02/01/2022    ALKPHOS 75 02/01/2022    AST 15 02/01/2022    ALT 17 02/01/2022     Lab Results   Component Value Date    ALKPHOS 75 02/01/2022    ALT 17 02/01/2022    AST 15 02/01/2022    PROT 6.7 02/01/2022    BILITOT 0.20 02/01/2022    LABALBU 3.9 02/01/2022     Lab Results   Component Value Date    BUN 13 02/01/2022    CREATININE 0.70 02/01/2022     Lab Results   Component Value Date    CALCIUM 8.8 02/01/2022     Lab Results   Component Value Date    AST 15 02/01/2022    ALT 17 02/01/2022     No components found for: AVZEI7N  No results found for: URICACID  No results found for: CKTOTAL  Lab Results   Component Value Date    BOBPELYQ68 310 02/01/2022         Drug Level    No results found for: PHENYTOIN, VALPROATE    No results found. Review of Systems   Constitutional: Negative for appetite change, chills, diaphoresis, fatigue, fever and unexpected weight change. HENT: Negative for congestion, dental problem, drooling, ear discharge, ear pain, facial swelling, hearing loss, mouth sores, nosebleeds, postnasal drip, sinus pressure, sore throat, tinnitus, trouble swallowing and voice change. Eyes: Negative for photophobia, pain, discharge, redness, itching and visual disturbance. Respiratory: Negative for apnea, cough, choking, chest tightness, shortness of breath and wheezing. Cardiovascular: Negative for chest pain, palpitations, leg swelling and near-syncope. Gastrointestinal: Negative for abdominal distention, abdominal pain, blood in stool, constipation, diarrhea, nausea and vomiting. Endocrine: Negative for cold intolerance, heat intolerance, polydipsia, polyphagia and polyuria. Genitourinary: Negative for bladder incontinence. Musculoskeletal: Negative for arthralgias, back pain, gait problem, joint swelling, myalgias, neck pain and neck stiffness. Skin: Negative for color change, pallor, rash and wound. Allergic/Immunologic: Negative for environmental allergies, food allergies and immunocompromised state. Neurological: Positive for dizziness. Negative for vertigo, tremors, focal weakness, seizures, syncope, facial asymmetry, speech difficulty, weakness, light-headedness, numbness, headaches and loss of balance. EPISODE  OF  VERTIGO. LEFT   TRIGEMINAL  NEURALGIA    Hematological: Negative for adenopathy. Does not bruise/bleed easily.    Psychiatric/Behavioral: Negative for agitation, behavioral problems, confusion, decreased concentration, dysphoric mood, hallucinations, memory loss, self-injury, sleep disturbance and suicidal ideas. The patient is not nervous/anxious and is not hyperactive. OBJECTIVE:      Physical Exam  Constitutional:       Appearance: She is well-developed. HENT:      Head: Normocephalic and atraumatic. No raccoon eyes or Berumen's sign. Right Ear: External ear normal.      Left Ear: External ear normal.      Nose: Nose normal.   Eyes:      Conjunctiva/sclera: Conjunctivae normal.      Pupils: Pupils are equal, round, and reactive to light. Neck:      Thyroid: No thyroid mass or thyromegaly. Vascular: No carotid bruit. Trachea: No tracheal deviation. Meningeal: Brudzinski's sign and Kernig's sign absent. Cardiovascular:      Rate and Rhythm: Normal rate and regular rhythm. Pulmonary:      Effort: Pulmonary effort is normal.   Musculoskeletal:         General: No tenderness. Normal range of motion. Cervical back: Normal range of motion and neck supple. No rigidity. No muscular tenderness. Normal range of motion. Skin:     General: Skin is warm. Coloration: Skin is not pale. Findings: No erythema or rash. Nails: There is no clubbing. Psychiatric:         Attention and Perception: She is attentive. Mood and Affect: Mood is not anxious or depressed. Affect is not labile, blunt or inappropriate. Speech: She is communicative. Speech is not rapid and pressured, delayed, slurred or tangential.         Behavior: Behavior is not agitated, slowed, aggressive, withdrawn, hyperactive or combative. Behavior is cooperative. Thought Content: Thought content is not paranoid or delusional. Thought content does not include homicidal or suicidal ideation. Thought content does not include homicidal or suicidal plan. Cognition and Memory: Memory is not impaired. She does not exhibit impaired recent memory or impaired remote memory.          Judgment: Judgment is not impulsive or inappropriate. NEUROLOGICALEXAMINATION :       A) MENTAL STATUS:                   Alert and  oriented  To time, place  And  Person. No Aphasia. No  Dysarthria. Able   To  Follow     SIMPLE    commands   without   Any  Difficulty. No right  To left confusion. Normal  Speech  And language function. Insight and  Judgment ,Fund  Of  Knowledge   within normal limits                Recent  And  Remote memory  within   normal limits                Attention &  Concentration are within   normal limits                                                   B) CRANIAL NERVES :        CN : Visual  Acuity  And  Visual fields  within normal limits               Fundi  Could  Not  Be  Could  Not  Be  Evaluated. 3,4,6 CN : Both  Pupils are   Reactive and  Equal.  Movements  Are  Intact. No  Nystagmus. No  HAZEL. No  Afferent  Pupillary  Defect noted. 5 CN :  Normal  Facial sensations and Corneal  Reflexes           7 CN:  Normal  Facial  Symmetry  And  Strength. No facial  Weakness. HYPERSENSITIVITY   ON  LEFT  SIDE  OF  FACE    IN  MAXILLARY  AREA           8 CN :  Hearing  Appears within normal limits          9, 10 CN: Normal   spontaneous, reflex   palate   movements         11 CN:   Normal  Shoulder  shrug and  strength         12 CN :   Normal  Tongue movements and  Tongue  In midline                        No tongue   Fasciculations or atrophy       C) MOTOR  EXAM:                 Strength  In upper  And  Lower   extremities   within   normal limits               No  Drift. No  Atrophy               Rapid   alternating  And  repetitions  Movements  within   normal limits                 Muscle  Tone  In upper  And  Lower  Extremities  normal                No rigidity. No  Spasticity.                  Bradykinesia   absent                 No Asterixis. Sustention  Tremor , Resting   Tremor   absent                    No   other  Abnormal  Movements noted           D) SENSORY :               Light   touch, pinprick,   position  And  Vibration  within normal limits        E) REFLEXES:                   Deep  Tendon  Reflexes   normal                  No  pathological  Reflexes  Bilaterally. F) COORDINATION  AND  GAIT :                                Station and  Gait  normal                              Romberg 's test negative                          Ataxia negative          ASSESSMENT:        Patient Active Problem List   Diagnosis    Murmur, cardiac    Mild aortic regurgitation    Palpitations    Meniere's disease    Temporomandibular joint disorder (TMJ)    Trigeminal neuralgia of left side of face    Smoker    Obesity (BMI 30-39. 9)    Other chronic sinusitis       MRI OF THE BRAIN WITHOUT AND WITH CONTRAST  2/1/2022 1:08 pm       TECHNIQUE:   Multiplanar multisequence MRI of the head/brain was performed without and   with the administration of intravenous contrast.       COMPARISON:   None available       HISTORY:   ORDERING SYSTEM PROVIDED HISTORY: Murmur, cardiac   TECHNOLOGIST PROVIDED HISTORY:   Is the patient pregnant?->No   Reason for Exam: Left side trigeminal neuralgia since 2015   Additional signs and symptoms: Trigeminal neuralgia of left side of face;   Meniere's disease, unspecified laterality       FINDINGS:   INTRACRANIAL STRUCTURES/VENTRICLES:  The cerebral and cerebellar parenchyma   demonstrate normal volume.  No abnormal extra-axial fluid collection.  The   ventricles are proportional to the cerebral sulci.  Normal major intracranial   flow voids are noted.       No areas of abnormal enhancement.  No areas of restricted diffusion.       IACs: The 7th and 8th cranial nerves are normal in appearance.  The cochlea,   vestibule, and semicircular canals are unremarkable.  The trigeminal nerves   are normal in appearance.       The cavernous sinuses are symmetric.  The infundibulum is midline.  No areas   of abnormal enhancement along the cerebellopontine angles or brainstem   cisterns.       ORBITS: The visualized portion of the orbits demonstrate no acute abnormality.       SINUSES: There is scattered mild paranasal sinus disease.  Trace bilateral   mastoid effusions.       BONES/SOFT TISSUES: The bone marrow signal intensity and craniocervical   junction are unremarkable.  Pituitary gland is normal in appearance.           Impression   1. Unremarkable MRI of the brain and IACs. 2. Scattered paranasal sinus disease with trace bilateral mastoid effusions.             VISITING DIAGNOSIS:      ICD-10-CM    1. Trigeminal neuralgia of left side of face  G50.0    2. Smoker  F17.200    3. Mild aortic regurgitation  I35.1    4. Obesity (BMI 30-39. 9)  E66.9    5. Meniere's disease, unspecified laterality  H81.09    6. Other chronic sinusitis  J32.8                         VARIOUS  RISK   FACTORS   WERE  REVIEWED   AND   DISCUSSED. PLAN:                         [place]  Of  The  Diagnoses,  The  Management & Treatment  Options            AND    Care  plan  Were          Reviewed and   Discussed   With  patient. * Goals  And  Expectations  Of  The  Therapy  Discussed   And  Reviewed. *   Benefits   And   Side  Effect  Profile  Of  Medication/s   Were   Discussed                * Need   For  Further   Follow up For  The  Various  Problems Were  discussed. * Results  Of  The  Previous  Diagnostic tests were reviewed and  discussed                   Medical  Decision  Making  Was  Made  Based on the   Complexity  Of  Above  Mentioned  Diagnoses,    Data reviewed             And    Risk  Of  Significant   Co morbidities and   complicating   Factors.                  Medical  Decision  Was    Moderate   Complexity   Due   To  The  Patient's  Multiple  Symptoms  & Disease,            Complex  Treatment  Regimen,  Multiple medications           and   Risk  Of   Side  Effects,  Difficulty  In  Medication  Management  And  Diagnostic  Challenges           In  View  Of  The  Associated   Co  Morbid  Conditions   And  Problems. *   ADEQUATE   FLUID  INTAKE   AND  ELECTROLYTE  BALANCE           * KEEP  DAIRY  OF   THE  NEUROLOGICAL  SYMPTOMS        RECORDING THE    DURATION  AND  FREQUENCY. *  AVOID    CONDITIONS  AND  FACTORS   THAT  MAKE                  NEUROLOGICAL  SYMPTOMS  WORSE.                    *TO  MAINTAIN  REGULAR  SLEEP  WAKE  CYCLES. *   TO  HAVE  ADEQUATE  REST  AND   SLEEP    HOURS.          *    TO   AVOID   TO  SLEEP  IN   SUPINE  POSITION. *      WEIGHT   LOSS. *    AVOID  ANY USAGE OF    TOBACCO,          AVOID  EXCESSIVE  ALCOHOL  AND   ILLEGAL   SUBSTANCES          *  CONTINUE   MEDICATIONS    PRESCRIBED       AS    RECOMMENDED       *   Compliance   With  Medications   And  Instructions          * CURRENTLY    TOLERATING  THE  PRESCRIBED   MEDICATIONS. WITHOUT  ANY  SIGNIFICANT  SIDE  EFFECTS   &  GETTING BENEFIT.         *    Antiplatelet  therapy    As   Recommended  Was   Discussed      *    Prophylactic  Use   Of     Vitamin   B   Complex,  Folic  Acid,    Vitamin  B12    Multivitamin,       Calcium  With  magnesium  And  Vit D    Supplementations   Over  The  Counter  Discussed                *  EVALUATIONS  AND  FOLLOW UP:                       *CARDIOLOGY                 *   DENTAL                     * ENT                                *   PATIENT      HAD    MRI  BRAIN IN  FEB. 2022    SHOWED                                   A)     UN  REMARKABLE    MRI  BRETT   AND  IACs                               B)    Scattered   Paranasal  Sinus  Disease     And                                           Trace  Bilateral   Mastoid  Effusions                              LABS    INCLUDING TEGRETOL   LEVELS                                      ARE    WITH  IN  NORMAL  LIMITS                              -   RESULTS   REVIEWED    WITH  PATIENT  IN  DETAIL                          *     RECOMMENDED  :                                    A)     TO  CONTINUE   TEGRETOL   400  MG  BID                                B)    ADD   NEURONTIN   300   MG    EVENING  AND  BEDTIME  AS NEEDED                                          FOR  SYMPTOMATIC    RELIEF                                 C)    FOLLOW UP   WITH  PCP    AND  ENT                                       VARIOUS  RISK   FACTORS   WERE  REVIEWED   AND   DISCUSSED. Controlled Substances Monitoring: Periodic Controlled Substance Monitoring: Possible medication side effects, risk of tolerance/dependence & alternative treatments discussed. ,Assessed functional status. Christiano Smith MD)          Orders Placed This Encounter   Medications    gabapentin (NEURONTIN) 300 MG capsule     Sig: TAKE   ONE    CAPSULE     IN   THE  EVENING  AND      AT  BED   TIME   AS  NEEDED     Dispense:  60 capsule     Refill:  1    carBAMazepine (CARBATROL) 200 MG extended release capsule     Sig: take 2 capsules by mouth twice a day     Dispense:  60 capsule     Refill:  2                                *PATIENT   TO  FOLLOW  UP  WITH   PRIMARY  CARE         OTHER  CONSULTANTS  AS  BEFORE. *  Maintain   Healthy  Life Style    With   Periodic  Monitoring  Of          Any  Medical  Conditions  Including   Elevated  Blood  Pressure,  Lipid  Profile,        Blood  Sugar levels  AndHeart  Disease. *   Period   Screening  For  Cancers  Involving  Breast,  Colon,         Lungs  And  Other  Organs  As  Applicable,  In consultation   With  Your  Primary Care Providers. *Second  Neurological  Opinion  And  Evaluations  In  [facility]  Setting  If  Patient  Is  Interested.                  * Please   Contact   Neurology  Clinic   Early   If   Are  Any  New  Neurological   And  Any neurological  Concerns. *  If  The  Patient remains  Neurologically  Stable   Return   To  [facility] Neurology Department   IN      2   MONTHS  TIME   FOR  FURTHER              FOLLOW UP.                       *   The  Neurological   Findings,  Possible  Diagnosis,  Differential diagnoses                    And  Options  For    Further   Investigations                   And  management   Are  Discussed  Comprehensively. Medications   And  Prescription   Risks  And  Side effects  Are   Also  Discussed. *  If   There is  Any  Significant  Worsening   Of  Current  Symptoms  And  Or                  If patient  Develops   Any additional  New  NeurologicalSymptoms                  Or  Significant  Concerns   Should  Call  911 or  Go  To  Emergency  Department                  For  Further  Immediate  Evaluation and  management . The   Above  Were  Reviewed  With  PATIENT   and                          questions  Answered  In  Detail. Electronically signed by   Natalia Briones M.D., [address]. Board Certified in  Neurology &  In  [id number] [address] [place] [address] of Psychiatry and Neurology ([facility])      DISCLAIMER:   Although every effort was made to ensure the accuracy of this  electronictranscription, some errors in transcription may have occurred. GENERAL PATIENT INSTRUCTIONS:      A Healthy Lifestyle: Care Instructions   Your Care Instructions   A healthy lifestyle can help you feel good, stay at ahealthy weight, and have plenty of energy for both work and play. A healthy lifestyle is something you can share with your whole family.    A healthy lifestyle also can lower your risk for serious health problems, such ashigh blood pressure, heart disease, and diabetes.  You can follow a few steps listed below to improve your health and the health of your family.  Follow-up careis a key part of your treatment and safety. Be sure to make and go to all appointments, and call your doctor if you are having problems. Its also a good idea to know your test results and keep a list of the medicines you take.  How can you care for yourself at home?  Do not eat too much sugar, fat, or fast foods. You can still have dessert and treats nowand then. The goal is moderation.  Start small to improve your eating habits. Pay attention to portion sizes, drink less juice and soda pop, and eat more fruits and vegetables.  Eat a healthy amount of food. A 3-ounce serving of meat, for example, is about the size of a deck of cards. Fill the rest of your plate with vegetables and whole grains.  Limit theamount of soda and sports drinks you have every day. Drink more water when you are thirsty.  Eat at least 5 servings of fruits and vegetables every day. It may seem like a lot, but it is not hard to reach this goal. Aserving or helping is 1 piece of fruit, 1 cup of vegetables, or 2 cups of leafy, raw vegetables. Have an apple or some carrot sticks as an afternoon snack instead of a candy bar. Try to have fruits and/or vegetables at everymeal.   Make exercise part of your daily routine. You may want to start with simple activities, such as walking, bicycling, or slow swimming. Try shon active 30 to 60 minutes every day. You do not need to do all 30 to 60 minutes all at once. For example, you can exercise 3 times a day for 10 or 20 minutes. Moderate exercise is safe for most people, but it is always agood idea to talk to your doctor before starting an exercise program.   Keep moving. [place] Jerezpin the lawn, work in the garden, or [employer]. Take the stairs instead of the elevator at work.  If you smoke, quit.  Peoplewho smoke have an increased risk for heart attack, stroke, cancer, and other lung illnesses. Quitting is hard, but there are ways to boost your chance of quitting tobacco for good.  Use nicotine gum, patches, or lozenges.  Ask your doctor about stop-smoking programs and medicines.  Keep trying.  In addition to reducing your risk of diseases in the future, you will notice some benefits soon after you stop using tobacco. If you have shortness of breath or asthma symptoms, they will likely getbetter within a few weeks after you quit.  Limit how much alcohol you drink. Moderate amounts of alcohol (up to 2 drinks a day for men, 1drink a day for women) are okay. But drinking too much can lead to liver problems, high blood pressure, and other health problems.  health   If you have a family, there are many things you can do together to improve your health.  Eat meals together as a family as often as possible.  Eat healthy foods. This includes fruits, vegetables, lean meats and dairy, and whole grains.  Include your family in your fitness plan. Most peoplethink of activities such as jogging or tennis as the way to fitness, but there are many ways you and your family can be more active. Anything that makes you breathe hard and gets your heart pumping is exercise. Here are sometips:   Walk to do errands or to take your child to school or the bus.  Go for a family bike ride after dinner instead of watching TV.  Where can you learn more?  Go [email or web address]. org and sign in to your [employer] account. Enter X167 in the Search HealthInformation box to learn more about \"A Healthy Lifestyle: Care Instructions. \"     If you do not have anaccount, please click on the \"Sign Up Now\" link.  Current as of: July 26, 2016   Content Version: 11.2   © 2557-5163 [employer], [employer]. Care instructions adapted under license by [facility] ([facility]).  If you have questions about a medical condition or this instruction, always ask your healthcare professional. [employer] disclaims any warranty or liability for your use of this information.

## 2022-05-03 RX ORDER — CARBAMAZEPINE 200 MG/1
CAPSULE, EXTENDED RELEASE ORAL
Qty: 60 CAPSULE | Refills: 2 | Status: SHIPPED | OUTPATIENT
Start: 2022-05-03 | End: 2022-05-12 | Stop reason: SDUPTHER

## 2022-05-12 ENCOUNTER — OFFICE VISIT (OUTPATIENT)
Dept: NEUROLOGY | Age: 52
End: 2022-05-12
Payer: MEDICARE

## 2022-05-12 VITALS
HEART RATE: 75 BPM | RESPIRATION RATE: 16 BRPM | OXYGEN SATURATION: 97 % | BODY MASS INDEX: 38.62 KG/M2 | DIASTOLIC BLOOD PRESSURE: 82 MMHG | SYSTOLIC BLOOD PRESSURE: 132 MMHG | WEIGHT: 218 LBS

## 2022-05-12 DIAGNOSIS — H81.09 MENIERE'S DISEASE, UNSPECIFIED LATERALITY: ICD-10-CM

## 2022-05-12 DIAGNOSIS — E66.9 OBESITY (BMI 30-39.9): ICD-10-CM

## 2022-05-12 DIAGNOSIS — J32.8 OTHER CHRONIC SINUSITIS: ICD-10-CM

## 2022-05-12 DIAGNOSIS — M26.609 TEMPOROMANDIBULAR JOINT DISORDER (TMJ): ICD-10-CM

## 2022-05-12 DIAGNOSIS — F17.200 SMOKER: ICD-10-CM

## 2022-05-12 DIAGNOSIS — G50.0 TRIGEMINAL NEURALGIA OF LEFT SIDE OF FACE: Primary | ICD-10-CM

## 2022-05-12 PROCEDURE — 99212 OFFICE O/P EST SF 10 MIN: CPT | Performed by: PSYCHIATRY & NEUROLOGY

## 2022-05-12 PROCEDURE — G8417 CALC BMI ABV UP PARAM F/U: HCPCS | Performed by: PSYCHIATRY & NEUROLOGY

## 2022-05-12 PROCEDURE — 99214 OFFICE O/P EST MOD 30 MIN: CPT | Performed by: PSYCHIATRY & NEUROLOGY

## 2022-05-12 PROCEDURE — 4004F PT TOBACCO SCREEN RCVD TLK: CPT | Performed by: PSYCHIATRY & NEUROLOGY

## 2022-05-12 PROCEDURE — G8427 DOCREV CUR MEDS BY ELIG CLIN: HCPCS | Performed by: PSYCHIATRY & NEUROLOGY

## 2022-05-12 RX ORDER — CARBAMAZEPINE 200 MG/1
CAPSULE, EXTENDED RELEASE ORAL
Qty: 60 CAPSULE | Refills: 5 | Status: SHIPPED | OUTPATIENT
Start: 2022-05-12 | End: 2022-08-18 | Stop reason: SDUPTHER

## 2022-05-12 RX ORDER — GABAPENTIN 300 MG/1
CAPSULE ORAL
Qty: 60 CAPSULE | Refills: 1 | Status: SHIPPED | OUTPATIENT
Start: 2022-05-12 | End: 2022-08-18 | Stop reason: SDUPTHER

## 2022-05-12 RX ORDER — AMIODARONE HYDROCHLORIDE 200 MG/1
TABLET ORAL
COMMUNITY
Start: 2022-05-03

## 2022-05-12 ASSESSMENT — ENCOUNTER SYMPTOMS
FACIAL SWELLING: 0
SINUS PRESSURE: 0
SORE THROAT: 0
SHORTNESS OF BREATH: 0
EYE ITCHING: 0
TROUBLE SWALLOWING: 0
CONSTIPATION: 0
BLOOD IN STOOL: 0
EYE PAIN: 0
CHEST TIGHTNESS: 0
VOICE CHANGE: 0
COLOR CHANGE: 0
ABDOMINAL DISTENTION: 0
CHOKING: 0
EYE DISCHARGE: 0
COUGH: 0
PHOTOPHOBIA: 0
EYE REDNESS: 0
APNEA: 0
NAUSEA: 0
DIARRHEA: 0
WHEEZING: 0
ABDOMINAL PAIN: 0
VOMITING: 0
VISUAL CHANGE: 0
BACK PAIN: 0

## 2022-05-12 ASSESSMENT — PATIENT HEALTH QUESTIONNAIRE - PHQ9
SUM OF ALL RESPONSES TO PHQ9 QUESTIONS 1 & 2: 0
1. LITTLE INTEREST OR PLEASURE IN DOING THINGS: 0
SUM OF ALL RESPONSES TO PHQ QUESTIONS 1-9: 0
2. FEELING DOWN, DEPRESSED OR HOPELESS: 0

## 2022-05-12 NOTE — PROGRESS NOTES
[facility]  Neurology    [address]:[phone]   Fax: [phone]        SUBJECTIVE:         PATIENT ID:  Jasmina Bacon is a  RIGHT    HANDED 54 y.o. female. Neurologic Problem  The patient's pertinent negatives include no altered mental status, clumsiness, focal sensory loss, focal weakness, loss of balance, memory loss, near-syncope, slurred speech, syncope, visual change or weakness. Primary symptoms comment: LEFT   TRIGEMINAL  NEURALGIA    IN  MAXILLARY    AREA   SINCE    2016  . This is a chronic problem. The neurological problem developed gradually. The problem has been waxing and waning since onset. There was no focality noted. Associated symptoms include dizziness. Pertinent negatives include no abdominal pain, auditory change, aura, back pain, bladder incontinence, chest pain, confusion, diaphoresis, fatigue, fever, headaches, light-headedness, nausea, neck pain, palpitations, shortness of breath, vertigo or vomiting. Treatments tried: TEGRETAL  The treatment provided moderate relief. There is no history of a bleeding disorder, a clotting disorder, a CVA, dementia, head trauma, liver disease, mood changes or seizures. History obtained from  The   PATIENT         and other  available   medical  records   were  Also  reviewed. The  Duration,  Quality,  Severity,  Location,  Timing,  Context,  Modifying  Factors   Of   The   Chief   Complaint       And  Present  Illness  Was   Reviewed   In   Chronological   Manner.                                             PATIENT'S  MAIN  CONCERNS INCLUDE :                       1)     H/O     TRIGEMINAL  NEURALGIA     ON  LEFT     FACE                                            MAXILLARY  DIVISION      SINCE   2016                                      PREVIOUS    H/O    DENTAL    EXTRACTION    IN   2016                                   AND    ALSO   HAD   FOLLOW   UP 2)     NO     H/O   TRAUMA    TO   FACE /  HEAD  AND  NECK  . 3)     PATIENT      HAD   NEUROLOGY   EVALUATIONS      IN                                   PRATT  CLINIC         AND     BEING       ON                                       TEGRETOL    XR    400    MG   BID      SINCE     2017                            4)      HAD    MRI   EVALUATIONS     IN    SEPT. 2016                                AND   IN    OCT. 2020       WITH   AND  W/O   CONTRAST                                    SHOWED   NO  SIGNIFICANT   ABNORMALITIES                                     -    REPORTS     REVIEWED       ON      1/21/2022                            5)     H/O     HYPERSENSITIVE    AND  BURNING     SENSATION                                    LEFT    LOWER   MAXILLARY   AREA                                      -    WELL   CONTROLLED                                     6)    H/O     CHRONIC  SMOKING                    PATIENT  AWARE  OF  RISKS  AND                 SIDE  EFFECTS  OF   SMOKING   DISCUSSED. PATIENT   ADVISED   AND  COUNSELED    TO   QUIT  SMOKING.                        7)       PREVIOUS     H/O   PALPITATIONS,    CARDIAC   MURMUR                               AND  MILD    AORTIC     REGURGITATION                                     -   BEING       FOLLOWED  BY    CARDIOLOGY                           8)      H/O     EPISODIC   VERTIGO      AND  MENIERE'S   DISEASE                                    HAD     ENT      EVALUATIONS                                     -   WAS        ON      HYDROCHLOROTHIAZIDE                                       -    IMPROVED                                                         9)   H/O      MILD     OBESITY                                                 10)      PATIENT      HAD    MRI  BRAIN IN  FEB. 2022    SHOWED                                   A)     UN  REMARKABLE    MRI  BRETT AND  IACs                               B)    Scattered   Paranasal  Sinus  Disease     And                                           Trace  Bilateral   Mastoid  Effusions                              LABS    INCLUDING     TEGRETOL   LEVELS                                      ARE    WITH  IN  NORMAL  LIMITS                              -   RESULTS   REVIEWED    WITH  PATIENT  IN  DETAIL                          11)     TRIGEMINAL  NEURALGIA       SYMPTOMS    ARE    FAIRLY                                 WELL  CONTROLLED       WITH    CURRENT  MEDICAL    MANAGEMENT                                  RECOMMENDED  :                                    A)     TO  CONTINUE   TEGRETOL   400  MG  BID                                B)    ADD   NEURONTIN   300   MG    EVENING  AND  BEDTIME  AS NEEDED                                          FOR  SYMPTOMATIC    RELIEF                                 C)    FOLLOW UP   WITH  PCP    AND  ENT                                       VARIOUS  RISK   FACTORS   WERE  REVIEWED   AND   DISCUSSED.                                                   PRECIPITATING  FACTORS: including  fever/infection, exertion/relaxation, position change, stress,                weather change,   medications/alcohol, time of day/darkness/light  Are  absent                                                          MODIFYING  FACTORS:  fever/infection, exertion/relaxation, position change, stress, weather change,               medications/alcohol, time of day/darkness/light  Are  absent                Patient   Indicates   The  Presence   And  The  Absence  Of  The  Following    Associated  And             Additional  Neurological    Symptoms:                                Balance  And coordination   problems  absent           Gait problems     absent            Headaches      absent              Migraines           absent           Memory problemsabsent              Confusion        absent            Paresthesia numbness          present           Seizures  And  Starring  Episodes           absent           Syncope,  Near  syncopal episodes         absent           Speech   problems           absent             Swallowing   Problems      absent            Dizziness,  Light headedness           absent              Vertigo        absent             Generalized   Weakness    absent              focal  Weakness     absent             Tremors         absent              Sleep  Problems     absent             History  Of   Recent  Head  Injury     absent             History  Of   Recent  TIA     absent             History  Of   Recent    Stroke     absent             Neck  Pain   and   Neck muscle  Spasms  absent               Radiating  down   And   Weakness           absent            Lower back   Pain  And     Spasms  absent              Radiating    Down   And   Weakness          absent                H/OFALLS        absent               History  Of   Visual  Symptoms    absent                  Associated   Diplopia       absent                                               Also   Additional   Symptoms   Present    As  Documented    In   The   detailed                  Review  Of  Systems   And    Please   Refer   To    Them for   Additional    Information. Any components  That are either  Unobtainable  Or  Limited  In   HPI, ROS  And/or PFSH   Are                   Due   ToPatient's  Medical  Problems,  Clinical  Condition   and/or lack of                                 other    Alternate   resources.             RECORDS   REVIEWED:    historical medical records           INFORMATION   REVIEWED:     MEDICAL   HISTORY,SURGICAL   HISTORY,     MEDICATIONS   LIST,   ALLERGIES AND  DRUG  INTOLERANCES,       FAMILY   HISTORY,  SOCIAL  HISTORY,      PROBLEM  LIST   FOR  PATIENT  CARE   COORDINATION          Past Medical History:   Diagnosis Date    Meniere's disease     Mild aortic regurgitation     with aortic insufficiency on echo 7/8/15    Murmur, cardiac     Palpitations     Temporomandibular joint disorder (TMJ)          Past Surgical History:   Procedure Laterality Date    TUBAL LIGATION           Current Outpatient Medications   Medication Sig Dispense Refill    amiodarone (CORDARONE) 200 MG tablet take 1 tablet by mouth twice a day      carBAMazepine (CARBATROL) 200 MG extended release capsule take 2 capsules by mouth twice a day 60 capsule 2    gabapentin (NEURONTIN) 300 MG capsule TAKE   ONE    CAPSULE     IN   THE  EVENING  AND      AT  BED   TIME   AS  NEEDED 60 capsule 1    hydrochlorothiazide (HYDRODIURIL) 25 MG tablet Take 1 tablet by mouth daily For Meniere's disease not htn 90 tablet 3    ibuprofen (ADVIL;MOTRIN) 200 MG tablet Take 200 mg by mouth as needed for Pain       No current facility-administered medications for this visit. No Known Allergies      History reviewed. No pertinent family history. Social History     Socioeconomic History    Marital status:      Spouse name: Not on file    Number of children: Not on file    Years of education: Not on file    Highest education level: Not on file   Occupational History    Not on file   Tobacco Use    Smoking status: Current Every Day Smoker     Packs/day: 1.00     Years: 9.00     Pack years: 9.00    Smokeless tobacco: Never Used    Tobacco comment: Will see PCP PRN cessation needs. Substance and Sexual Activity    Alcohol use: Not Currently    Drug use: No    Sexual activity: Not on file   Other Topics Concern    Not on file   Social History Narrative    Not on file     Social Determinants of Health     Financial Resource Strain:     Difficulty of Paying Living Expenses: Not on file   Food Insecurity:     Worried About Running Out of Food in the Last Year: Not on file    Gonzalo of Food in the Last Year: Not on file   Transportation Needs:     Lack of Transportation (Medical):  Not on file    Lack of Transportation (Non-Medical):  Not on file   Physical Activity:     Days of Exercise per Week: Not on file    Minutes of Exercise per Session: Not on file   Stress:     Feeling of Stress : Not on file   Social Connections:     Frequency of Communication with Friends and Family: Not on file    Frequency of Social Gatherings with Friends and Family: Not on file    Attends Oriental orthodox Services: Not on file    Active Member of [address] [place] [address] or Organizations: Not on file    Attends Club or Organization Meetings: Not on file    Marital Status: Not on file   Intimate Partner Violence:     Fear of Current or Ex-Partner: Not on file    Emotionally Abused: Not on file    Physically Abused: Not on file    Sexually Abused: Not on file   Housing Stability:     Unable to Pay for Housing in the Last Year: Not on file    Number of [place] in the Last Year: Not on file    Unstable Housing in the Last Year: Not on file       Vitals:    05/12/22 1204   BP: 132/82   Pulse: 75   Resp: 16   SpO2: 97%         Wt Readings from Last 3 Encounters:   05/12/22 218 lb (98.9 kg)   02/17/22 218 lb 4 oz (99 kg)   01/21/22 218 lb 4 oz (99 kg)         BP Readings from Last 3 Encounters:   05/12/22 132/82   02/17/22 130/88   01/21/22 (!) 140/88           Hematology and Coagulation    Lab Results   Component Value Date    WBC 8.3 02/01/2022    RBC 4.10 02/01/2022    HGB 12.9 02/01/2022    HCT 37.9 02/01/2022    MCV 92.4 02/01/2022    MCH 31.5 02/01/2022    MCHC 34.0 02/01/2022    RDW 12.5 02/01/2022     02/01/2022    MPV 9.8 02/01/2022       No results found for: ESR    Chemistries    Lab Results   Component Value Date     02/01/2022    K 4.2 02/01/2022     02/01/2022    CO2 24 02/01/2022    BUN 13 02/01/2022    CREATININE 0.70 02/01/2022    CALCIUM 8.8 02/01/2022    PROT 6.7 02/01/2022    LABALBU 3.9 02/01/2022    BILITOT 0.20 02/01/2022    ALKPHOS 75 02/01/2022    AST 15 02/01/2022    ALT 17 02/01/2022     Lab Results Component Value Date    ALKPHOS 75 02/01/2022    ALT 17 02/01/2022    AST 15 02/01/2022    PROT 6.7 02/01/2022    BILITOT 0.20 02/01/2022    LABALBU 3.9 02/01/2022     Lab Results   Component Value Date    BUN 13 02/01/2022    CREATININE 0.70 02/01/2022     Lab Results   Component Value Date    CALCIUM 8.8 02/01/2022     Lab Results   Component Value Date    AST 15 02/01/2022    ALT 17 02/01/2022     No components found for: RZHKC5U  No results found for: URICACID  No results found for: CKTOTAL  Lab Results   Component Value Date    WCEWULHQ27 310 02/01/2022         Drug Level    No results found for: PHENYTOIN, VALPROATE    No results found. Review of Systems   Constitutional: Negative for appetite change, chills, diaphoresis, fatigue, fever and unexpected weight change. HENT: Negative for congestion, dental problem, drooling, ear discharge, ear pain, facial swelling, hearing loss, mouth sores, nosebleeds, postnasal drip, sinus pressure, sore throat, tinnitus, trouble swallowing and voice change. Eyes: Negative for photophobia, pain, discharge, redness, itching and visual disturbance. Respiratory: Negative for apnea, cough, choking, chest tightness, shortness of breath and wheezing. Cardiovascular: Negative for chest pain, palpitations, leg swelling and near-syncope. Gastrointestinal: Negative for abdominal distention, abdominal pain, blood in stool, constipation, diarrhea, nausea and vomiting. Endocrine: Negative for cold intolerance, heat intolerance, polydipsia, polyphagia and polyuria. Genitourinary: Negative for bladder incontinence. Musculoskeletal: Negative for arthralgias, back pain, gait problem, joint swelling, myalgias, neck pain and neck stiffness. Skin: Negative for color change, pallor, rash and wound. Allergic/Immunologic: Negative for environmental allergies, food allergies and immunocompromised state. Neurological: Positive for dizziness.  Negative for vertigo, tremors, focal weakness, seizures, syncope, facial asymmetry, speech difficulty, weakness, light-headedness, numbness, headaches and loss of balance. EPISODE  OF  VERTIGO. LEFT   TRIGEMINAL  NEURALGIA    Hematological: Negative for adenopathy. Does not bruise/bleed easily. Psychiatric/Behavioral: Negative for agitation, behavioral problems, confusion, decreased concentration, dysphoric mood, hallucinations, memory loss, self-injury, sleep disturbance and suicidal ideas. The patient is not nervous/anxious and is not hyperactive. OBJECTIVE:      Physical Exam  Constitutional:       Appearance: She is well-developed. HENT:      Head: Normocephalic and atraumatic. No raccoon eyes or Berumen's sign. Right Ear: External ear normal.      Left Ear: External ear normal.      Nose: Nose normal.   Eyes:      Conjunctiva/sclera: Conjunctivae normal.      Pupils: Pupils are equal, round, and reactive to light. Neck:      Thyroid: No thyroid mass or thyromegaly. Vascular: No carotid bruit. Trachea: No tracheal deviation. Meningeal: Brudzinski's sign and Kernig's sign absent. Cardiovascular:      Rate and Rhythm: Normal rate and regular rhythm. Pulmonary:      Effort: Pulmonary effort is normal.   Musculoskeletal:         General: No tenderness. Normal range of motion. Cervical back: Normal range of motion and neck supple. No rigidity. No muscular tenderness. Normal range of motion. Skin:     General: Skin is warm. Coloration: Skin is not pale. Findings: No erythema or rash. Nails: There is no clubbing. Psychiatric:         Attention and Perception: She is attentive. Mood and Affect: Mood is not anxious or depressed. Affect is not labile, blunt or inappropriate. Speech: She is communicative.  Speech is not rapid and pressured, delayed, slurred or tangential.         Behavior: Behavior is not agitated, slowed, aggressive, withdrawn, hyperactive or combative. Behavior is cooperative. Thought Content: Thought content is not paranoid or delusional. Thought content does not include homicidal or suicidal ideation. Thought content does not include homicidal or suicidal plan. Cognition and Memory: Memory is not impaired. She does not exhibit impaired recent memory or impaired remote memory. Judgment: Judgment is not impulsive or inappropriate. NEUROLOGICALEXAMINATION :       A) MENTAL STATUS:                   Alert and  oriented  To time, place  And  Person. No Aphasia. No  Dysarthria. Able   To  Follow     SIMPLE    commands   without   Any  Difficulty. No right  To left confusion. Normal  Speech  And language function. Insight and  Judgment ,Fund  Of  Knowledge   within normal limits                Recent  And  Remote memory  within   normal limits                Attention &  Concentration are within   normal limits                                                   B) CRANIAL NERVES :        CN : Visual  Acuity  And  Visual fields  within normal limits               Fundi  Could  Not  Be  Could  Not  Be  Evaluated. 3,4,6 CN : Both  Pupils are   Reactive and  Equal.  Movements  Are  Intact. No  Nystagmus. No  HAZEL. No  Afferent  Pupillary  Defect noted. 5 CN :  Normal  Facial sensations and Corneal  Reflexes           7 CN:  Normal  Facial  Symmetry  And  Strength. No facial  Weakness.                           HYPERSENSITIVITY   ON  LEFT  SIDE  OF  FACE    IN  MAXILLARY  AREA           8 CN :  Hearing  Appears within normal limits          9, 10 CN: Normal   spontaneous, reflex   palate   movements         11 CN:   Normal  Shoulder  shrug and  strength         12 CN :   Normal  Tongue movements and  Tongue  In midline                        No tongue   Fasciculations or atrophy       C) MOTOR  EXAM:                 Strength  In upper  And  Lower   extremities   within   normal limits               No  Drift. No  Atrophy               Rapid   alternating  And  repetitions  Movements  within   normal limits                 Muscle  Tone  In upper  And  Lower  Extremities  normal                No rigidity. No  Spasticity. Bradykinesia   absent                 No  Asterixis. Sustention  Tremor , Resting   Tremor   absent                    No   other  Abnormal  Movements noted           D) SENSORY :               Light   touch, pinprick,   position  And  Vibration  within normal limits        E) REFLEXES:                   Deep  Tendon  Reflexes   normal                  No  pathological  Reflexes  Bilaterally. F) COORDINATION  AND  GAIT :                                Station and  Gait  normal                              Romberg 's test negative                          Ataxia negative          ASSESSMENT:        Patient Active Problem List   Diagnosis    Murmur, cardiac    Mild aortic regurgitation    Palpitations    Meniere's disease    Temporomandibular joint disorder (TMJ)    Trigeminal neuralgia of left side of face    Smoker    Obesity (BMI 30-39. 9)    Other chronic sinusitis             MRI OF THE BRAIN WITHOUT AND WITH CONTRAST  2/1/2022 1:08 pm       TECHNIQUE:   Multiplanar multisequence MRI of the head/brain was performed without and   with the administration of intravenous contrast.       COMPARISON:   None available       HISTORY:   ORDERING SYSTEM PROVIDED HISTORY: Murmur, cardiac   TECHNOLOGIST PROVIDED HISTORY:   Is the patient pregnant?->No   Reason for Exam: Left side trigeminal neuralgia since 2015   Additional signs and symptoms: Trigeminal neuralgia of left side of face;   Meniere's disease, unspecified laterality       FINDINGS:   INTRACRANIAL STRUCTURES/VENTRICLES:  The cerebral and cerebellar parenchyma   demonstrate normal volume.  No abnormal extra-axial fluid collection.  The   ventricles are proportional to the cerebral sulci.  Normal major intracranial   flow voids are noted.       No areas of abnormal enhancement.  No areas of restricted diffusion.       IACs: The 7th and 8th cranial nerves are normal in appearance.  The cochlea,   vestibule, and semicircular canals are unremarkable.  The trigeminal nerves   are normal in appearance.       The cavernous sinuses are symmetric.  The infundibulum is midline.  No areas   of abnormal enhancement along the cerebellopontine angles or brainstem   cisterns.       ORBITS: The visualized portion of the orbits demonstrate no acute abnormality.       SINUSES: There is scattered mild paranasal sinus disease.  Trace bilateral   mastoid effusions.       BONES/SOFT TISSUES: The bone marrow signal intensity and craniocervical   junction are unremarkable.  Pituitary gland is normal in appearance.           Impression   1. Unremarkable MRI of the brain and IACs. 2. Scattered paranasal sinus disease with trace bilateral mastoid effusions.             VISITING DIAGNOSIS:      ICD-10-CM    1. Trigeminal neuralgia of left side of face  G50.0    2. Obesity (BMI 30-39. 9)  E66.9    3. Meniere's disease, unspecified laterality  H81.09    4. Smoker  F17.200    5. Temporomandibular joint disorder (TMJ)  M26.609    6. Other chronic sinusitis  J32.8                         VARIOUS  RISK   FACTORS   WERE  REVIEWED   AND   DISCUSSED. PLAN:                         Dee Baldwin  Of  The  Diagnoses,  The  Management & Treatment  Options            AND    Care  plan  Were          Reviewed and   Discussed   With  patient. * Goals  And  Expectations  Of  The  Therapy  Discussed   And  Reviewed.           *   Benefits   And   Side  Effect  Profile  Of  Medication/s   Were   Discussed                * Need   For  Further   Follow up For  The  Various  Problems Were discussed. * Results  Of  The  Previous  Diagnostic tests were reviewed and  discussed                   Medical  Decision  Making  Was  Made  Based on the   Complexity  Of  Above  Mentioned  Diagnoses,    Data reviewed             And    Risk  Of  Significant   Co morbidities and   complicating   Factors. Medical  Decision  Was    Moderate   Complexity   Due   To  The  Patient's  Multiple  Symptoms  &  Disease,            Complex  Treatment  Regimen,  Multiple medications           and   Risk  Of   Side  Effects,  Difficulty  In  Medication  Management  And  Diagnostic  Challenges           In  View  Of  The  Associated   Co  Morbid  Conditions   And  Problems. *   ADEQUATE   FLUID  INTAKE   AND  ELECTROLYTE  BALANCE           * KEEP  DAIRY  OF   THE  NEUROLOGICAL  SYMPTOMS        RECORDING THE    DURATION  AND  FREQUENCY. *  AVOID    CONDITIONS  AND  FACTORS   THAT  MAKE                  NEUROLOGICAL  SYMPTOMS  WORSE.                    *TO  MAINTAIN  REGULAR  SLEEP  WAKE  CYCLES. *   TO  HAVE  ADEQUATE  REST  AND   SLEEP    HOURS.          *    TO   AVOID   TO  SLEEP  IN   SUPINE  POSITION. *      WEIGHT   LOSS. *    AVOID  ANY USAGE OF    TOBACCO,          AVOID  EXCESSIVE  ALCOHOL  AND   ILLEGAL   SUBSTANCES          *  CONTINUE   MEDICATIONS    PRESCRIBED       AS    RECOMMENDED       *   Compliance   With  Medications   And  Instructions          * CURRENTLY    TOLERATING  THE  PRESCRIBED   MEDICATIONS. WITHOUT  ANY  SIGNIFICANT  SIDE  EFFECTS   &  GETTING BENEFIT.         *    Antiplatelet  therapy    As   Recommended  Was   Discussed      *    Prophylactic  Use   Of     Vitamin   B   Complex,  Folic  Acid,    Vitamin  B12    Multivitamin,       Calcium  With  magnesium  And  Vit D    Supplementations   Over  The  Counter  Discussed                *  EVALUATIONS  AND  FOLLOW UP:                       *CARDIOLOGY *   DENTAL                     * ENT                                *   PATIENT      HAD    MRI  BRAIN IN  FEB. 2022    SHOWED                                   A)     UN  REMARKABLE    MRI  BRETT   AND  IACs                               B)    Scattered   Paranasal  Sinus  Disease     And                                           Trace  Bilateral   Mastoid  Effusions                              LABS    INCLUDING     TEGRETOL   LEVELS                                      ARE    WITH  IN  NORMAL  LIMITS                              -   RESULTS   REVIEWED    WITH  PATIENT  IN  DETAIL                          *     RECOMMENDED  :                                    A)     TO  CONTINUE   TEGRETOL   400  MG  BID                                B)    ADD   NEURONTIN   300   MG    EVENING  AND  BEDTIME  AS NEEDED                                          FOR  SYMPTOMATIC    RELIEF                                                           VARIOUS  RISK   FACTORS   WERE  REVIEWED   AND   DISCUSSED. Controlled Substances Monitoring: Periodic Controlled Substance Monitoring: Possible medication side effects, risk of tolerance/dependence & alternative treatments discussed. ,Assessed functional status. Allan Ramsay MD)            Orders Placed This Encounter   Medications    gabapentin (NEURONTIN) 300 MG capsule     Sig: TAKE   ONE    CAPSULE     IN   THE  EVENING  AND      AT  BED   TIME   AS  NEEDED     Dispense:  60 capsule     Refill:  1    carBAMazepine (CARBATROL) 200 MG extended release capsule     Sig: take 2 capsules by mouth twice a day     Dispense:  60 capsule     Refill:  5                              *PATIENT   TO  FOLLOW  UP  WITH   PRIMARY  CARE         OTHER  CONSULTANTS  AS  BEFORE.                 *  Maintain   Healthy  Life Style    With   Periodic  Monitoring  Of          Any  Medical  Conditions  Including   Elevated  Blood  Pressure,  Lipid  Profile, Blood  Sugar levels  AndHeart  Disease. *   Period   Screening  For  Cancers  Involving  Breast,  Colon,         Lungs  And  Other  Organs  As  Applicable,  In consultation   With  Your  Primary Care Providers. *Second  Neurological  Opinion  And  Evaluations  In  [facility] AND [facility]  Setting  If  Patient  Is  Interested. * Please   Contact   Neurology  Clinic   Early   If   Are  Any  New  Neurological   And  Any neurological  Concerns. *  If  The  Patient remains  Neurologically  Stable   Return   To  [facility] Neurology Department   IN      3   MONTHS  TIME   FOR  FURTHER              FOLLOW UP.                       *   The  Neurological   Findings,  Possible  Diagnosis,  Differential diagnoses                    And  Options  For    Further   Investigations                   And  management   Are  Discussed  Comprehensively. Medications   And  Prescription   Risks  And  Side effects  Are   Also  Discussed. *  If   There is  Any  Significant  Worsening   Of  Current  Symptoms  And  Or                  If patient  Develops   Any additional  New  NeurologicalSymptoms                  Or  Significant  Concerns   Should  Call  911 or  Go  To  Emergency  Department                  For  Further  Immediate  Evaluation and  management . The   Above  Were  Reviewed  With  PATIENT   and                          questions  Answered  In  Detail. Electronically signed by   Fabrice Ambriz M.D., Diandra Cantu. Board Certified in  Neurology &  In  [facility] ([employer])      DISCLAIMER:   Although every effort was made to ensure the accuracy of this  electronictranscription, some errors in transcription may have occurred.       GENERAL PATIENT INSTRUCTIONS:      A Healthy Lifestyle: Care Instructions   Your Care Instructions   A healthy lifestyle can help you feel good, stay at ahealthy weight, and have plenty of energy for both work and play. A healthy lifestyle is something you can share with your whole family.  A healthy lifestyle also can lower your risk for serious health problems, such ashigh blood pressure, heart disease, and diabetes.  You can follow a few steps listed below to improve your health and the health of your family.  Follow-up careis a key part of your treatment and safety. Be sure to make and go to all appointments, and call your doctor if you are having problems. Its also a good idea to know your test results and keep a list of the medicines you take.  How can you care for yourself at home?  Do not eat too much sugar, fat, or fast foods. You can still have dessert and treats nowand then. The goal is moderation.  Start small to improve your eating habits. Pay attention to portion sizes, drink less juice and soda pop, and eat more fruits and vegetables.  Eat a healthy amount of food. A 3-ounce serving of meat, for example, is about the size of a deck of cards. Fill the rest of your plate with vegetables and whole grains.  Limit theamount of soda and sports drinks you have every day. Drink more water when you are thirsty.  Eat at least 5 servings of fruits and vegetables every day. It may seem like a lot, but it is not hard to reach this goal. Aserving or helping is 1 piece of fruit, 1 cup of vegetables, or 2 cups of leafy, raw vegetables. Have an apple or some carrot sticks as an afternoon snack instead of a candy bar. Try to have fruits and/or vegetables at everymeal.   Make exercise part of your daily routine. You may want to start with simple activities, such as walking, bicycling, or slow swimming. Try shon active 30 to 60 minutes every day. You do not need to do all 30 to 60 minutes all at once.  For example, you can exercise 3 times a day for 10 or 20 minutes. Moderate exercise is safe for most people, but it is always agood idea to talk to your doctor before starting an exercise program.   Keep moving. o[employer]nte Laws the lawn, work in the garden, or [employer]. Take the stairs instead of the elevator at work.  If you smoke, quit. Peoplewho smoke have an increased risk for heart attack, stroke, cancer, and other lung illnesses. Quitting is hard, but there are ways to boost your chance of quitting tobacco for good.  Use nicotine gum, patches, or lozenges.  Ask your doctor about stop-smoking programs and medicines.  Keep trying.  In addition to reducing your risk of diseases in the future, you will notice some benefits soon after you stop using tobacco. If you have shortness of breath or asthma symptoms, they will likely getbetter within a few weeks after you quit.  Limit how much alcohol you drink. Moderate amounts of alcohol (up to 2 drinks a day for men, 1drink a day for women) are okay. But drinking too much can lead to liver problems, high blood pressure, and other health problems.  health   If you have a family, there are many things you can do together to improve your health.  Eat meals together as a family as often as possible.  Eat healthy foods. This includes fruits, vegetables, lean meats and dairy, and whole grains.  Include your family in your fitness plan. Most peoplethink of activities such as jogging or tennis as the way to fitness, but there are many ways you and your family can be more active. Anything that makes you breathe hard and gets your heart pumping is exercise. Here are sometips:   Walk to do errands or to take your child to school or the bus.  Go for a family bike ride after dinner instead of watching TV.  Where can you learn more?  Go [email or web address] and sign in to your [employer] account.  Enter E727 in the Search HealthInformation box to learn more about \"A Healthy Lifestyle: Care Instructions. \"     If you do not have anaccount, please click on the \"Sign Up Now\" link.  Current as of: July 26, 2016   Content Version: 11.2   © 2785-4080 [employer]. Care instructions adapted under license by [facility] ([facility]). If you have questions about a medical condition or this instruction, always ask your healthcare professional. [employer] disclaims any warranty or liability for your use of this information.

## 2022-08-18 ENCOUNTER — OFFICE VISIT (OUTPATIENT)
Dept: NEUROLOGY | Age: 52
End: 2022-08-18
Payer: MEDICARE

## 2022-08-18 VITALS
SYSTOLIC BLOOD PRESSURE: 126 MMHG | BODY MASS INDEX: 37.74 KG/M2 | HEIGHT: 63 IN | DIASTOLIC BLOOD PRESSURE: 84 MMHG | WEIGHT: 213 LBS

## 2022-08-18 DIAGNOSIS — H81.09 MENIERE'S DISEASE, UNSPECIFIED LATERALITY: ICD-10-CM

## 2022-08-18 DIAGNOSIS — I35.1 MILD AORTIC REGURGITATION: ICD-10-CM

## 2022-08-18 DIAGNOSIS — J32.8 OTHER CHRONIC SINUSITIS: ICD-10-CM

## 2022-08-18 DIAGNOSIS — G50.0 TRIGEMINAL NEURALGIA OF LEFT SIDE OF FACE: Primary | ICD-10-CM

## 2022-08-18 DIAGNOSIS — F17.200 SMOKER: ICD-10-CM

## 2022-08-18 DIAGNOSIS — E66.9 OBESITY (BMI 30-39.9): ICD-10-CM

## 2022-08-18 DIAGNOSIS — M26.609 TEMPOROMANDIBULAR JOINT DISORDER (TMJ): ICD-10-CM

## 2022-08-18 DIAGNOSIS — R01.1 MURMUR, CARDIAC: ICD-10-CM

## 2022-08-18 PROCEDURE — G8427 DOCREV CUR MEDS BY ELIG CLIN: HCPCS | Performed by: PSYCHIATRY & NEUROLOGY

## 2022-08-18 PROCEDURE — 4004F PT TOBACCO SCREEN RCVD TLK: CPT | Performed by: PSYCHIATRY & NEUROLOGY

## 2022-08-18 PROCEDURE — 99214 OFFICE O/P EST MOD 30 MIN: CPT | Performed by: PSYCHIATRY & NEUROLOGY

## 2022-08-18 PROCEDURE — G8417 CALC BMI ABV UP PARAM F/U: HCPCS | Performed by: PSYCHIATRY & NEUROLOGY

## 2022-08-18 PROCEDURE — 3017F COLORECTAL CA SCREEN DOC REV: CPT | Performed by: PSYCHIATRY & NEUROLOGY

## 2022-08-18 PROCEDURE — 99212 OFFICE O/P EST SF 10 MIN: CPT | Performed by: PSYCHIATRY & NEUROLOGY

## 2022-08-18 RX ORDER — GABAPENTIN 300 MG/1
CAPSULE ORAL
Qty: 60 CAPSULE | Refills: 1 | Status: SHIPPED | OUTPATIENT
Start: 2022-08-18 | End: 2022-10-13 | Stop reason: SDUPTHER

## 2022-08-18 RX ORDER — CARBAMAZEPINE 200 MG/1
CAPSULE, EXTENDED RELEASE ORAL
Qty: 60 CAPSULE | Refills: 5 | Status: SHIPPED | OUTPATIENT
Start: 2022-08-18 | End: 2022-08-18 | Stop reason: SDUPTHER

## 2022-08-18 RX ORDER — CARBAMAZEPINE 200 MG/1
CAPSULE, EXTENDED RELEASE ORAL
Qty: 120 CAPSULE | Refills: 5 | Status: SHIPPED | OUTPATIENT
Start: 2022-08-18

## 2022-08-18 ASSESSMENT — ENCOUNTER SYMPTOMS
CHOKING: 0
CONSTIPATION: 0
COLOR CHANGE: 0
WHEEZING: 0
ABDOMINAL DISTENTION: 0
SINUS PRESSURE: 0
BLOOD IN STOOL: 0
VOMITING: 0
EYE DISCHARGE: 0
ABDOMINAL PAIN: 0
APNEA: 0
CHEST TIGHTNESS: 0
NAUSEA: 0
PHOTOPHOBIA: 0
COUGH: 0
VOICE CHANGE: 0
EYE ITCHING: 0
TROUBLE SWALLOWING: 0
EYE REDNESS: 0
DIARRHEA: 0
SHORTNESS OF BREATH: 0
FACIAL SWELLING: 0
SORE THROAT: 0
BACK PAIN: 0
VISUAL CHANGE: 0
EYE PAIN: 0

## 2022-08-18 NOTE — PROGRESS NOTES
[facility]  Neurology    [address] [place] [address]  YZTIM:[phone]   Fax: [phone]        SUBJECTIVE:         PATIENT ID:  Ct Limon is a  RIGHT    HANDED 50 y.o. female. Neurologic Problem  The patient's pertinent negatives include no altered mental status, clumsiness, focal sensory loss, focal weakness, loss of balance, memory loss, near-syncope, slurred speech, syncope, visual change or weakness. Primary symptoms comment: LEFT   TRIGEMINAL  NEURALGIA    IN  MAXILLARY    AREA   SINCE    2016  . This is a chronic problem. The neurological problem developed gradually. The problem has been waxing and waning since onset. There was no focality noted. Associated symptoms include dizziness. Pertinent negatives include no abdominal pain, auditory change, aura, back pain, bladder incontinence, chest pain, confusion, diaphoresis, fatigue, fever, headaches, light-headedness, nausea, neck pain, palpitations, shortness of breath, vertigo or vomiting. Treatments tried: TEGRETAL  The treatment provided moderate relief. There is no history of a bleeding disorder, a clotting disorder, a CVA, dementia, head trauma, liver disease, mood changes or seizures. History obtained from  The   PATIENT         and other  available   medical  records   were  Also  reviewed. The  Duration,  Quality,  Severity,  Location,  Timing,  Context,  Modifying  Factors   Of   The   Chief   Complaint       And  Present  Illness  Was   Reviewed   In   Chronological   Manner.                                             PATIENT'S  MAIN  CONCERNS INCLUDE :                       1)     H/O     TRIGEMINAL  NEURALGIA     ON  LEFT     FACE                                            MAXILLARY  DIVISION      SINCE   2016                                      PREVIOUS    H/O    DENTAL    EXTRACTION    IN   2016                                   AND    ALSO   HAD   FOLLOW   UP 2)     NO     H/O   TRAUMA    TO   FACE /  HEAD  AND  NECK  . 3)     PATIENT      HAD   NEUROLOGY   EVALUATIONS      IN                                   PRATT  CLINIC         AND     BEING       ON                                       TEGRETOL    XR    400    MG   BID      SINCE     2017                            4)      HAD    MRI   EVALUATIONS     IN    SEPT. 2016                                AND   IN    OCT. 2020       WITH   AND  W/O   CONTRAST                                    SHOWED   NO  SIGNIFICANT   ABNORMALITIES                                     -    REPORTS     REVIEWED       ON      1/21/2022                            5)     H/O     HYPERSENSITIVE    AND  BURNING     SENSATION                                    LEFT    LOWER   MAXILLARY   AREA                                      -    WELL   CONTROLLED                                     6)    H/O     CHRONIC  SMOKING                    PATIENT  AWARE  OF  RISKS  AND                 SIDE  EFFECTS  OF   SMOKING   DISCUSSED. PATIENT   ADVISED   AND  COUNSELED    TO   QUIT  SMOKING.                        7)       PREVIOUS     H/O   PALPITATIONS,    CARDIAC   MURMUR                               AND  MILD    AORTIC     REGURGITATION                                     -   BEING       FOLLOWED  BY    CARDIOLOGY                           8)      H/O     EPISODIC   VERTIGO      AND  MENIERE'S   DISEASE                                    HAD     ENT      EVALUATIONS                                     -   WAS        ON      HYDROCHLOROTHIAZIDE                                       -    IMPROVED                                                       9)         H/O      MILD     OBESITY                                                 10)      PATIENT      HAD    MRI  BRAIN IN  FEB. 2022    SHOWED                                   A)     UN  REMARKABLE    MRI BRETT   AND  Kymberly                               B)    Scattered   Paranasal  Sinus  Disease     And                                           Trace  Bilateral   Mastoid  Effusions                              LABS    INCLUDING     TEGRETOL   LEVELS                                      ARE    WITH  IN  NORMAL  LIMITS                              -   RESULTS   REVIEWED    WITH  PATIENT  IN  DETAIL                          11)     TRIGEMINAL  NEURALGIA       SYMPTOMS    ARE    FAIRLY                                 WELL  CONTROLLED       WITH    CURRENT  MEDICAL    MANAGEMENT                              PATIENT  DENIES     ANY   NEW  NEUROLOGICAL    CONCERNS. RECOMMENDED  :                                    A)     TO  CONTINUE   TEGRETOL   400  MG  BID                                B)      NEURONTIN   300   MG    EVENING  AND  BEDTIME  AS NEEDED                                          FOR  SYMPTOMATIC    RELIEF                                 C)    FOLLOW UP   WITH  PCP    AND  ENT                                       VARIOUS  RISK   FACTORS   WERE  REVIEWED   AND   DISCUSSED.                                                   PRECIPITATING  FACTORS: including  fever/infection, exertion/relaxation, position change, stress,                weather change,   medications/alcohol, time of day/darkness/light  Are  absent                                                          MODIFYING  FACTORS:  fever/infection, exertion/relaxation, position change, stress, weather change,               medications/alcohol, time of day/darkness/light  Are  absent                Patient   Indicates   The  Presence   And  The  Absence  Of  The  Following    Associated  And             Additional  Neurological    Symptoms:                                Balance  And coordination   problems  absent           Gait problems     absent            Headaches      absent              Migraines           absent History:   Diagnosis Date    Meniere's disease     Mild aortic regurgitation     with aortic insufficiency on echo 7/8/15    Murmur, cardiac     Palpitations     Temporomandibular joint disorder (TMJ)          Past Surgical History:   Procedure Laterality Date    TUBAL LIGATION           Current Outpatient Medications   Medication Sig Dispense Refill    gabapentin (NEURONTIN) 300 MG capsule TAKE   ONE    CAPSULE     IN   THE  EVENING  AND      AT  BED   TIME   AS  NEEDED 60 capsule 1    carBAMazepine (CARBATROL) 200 MG extended release capsule take 2 capsules by mouth twice a day 120 capsule 5    amiodarone (CORDARONE) 200 MG tablet take 1 tablet by mouth twice a day      hydrochlorothiazide (HYDRODIURIL) 25 MG tablet Take 1 tablet by mouth daily For Meniere's disease not htn 90 tablet 3    ibuprofen (ADVIL;MOTRIN) 200 MG tablet Take 200 mg by mouth as needed for Pain       No current facility-administered medications for this visit. No Known Allergies      History reviewed. No pertinent family history. Social History     Socioeconomic History    Marital status:      Spouse name: Not on file    Number of children: Not on file    Years of education: Not on file    Highest education level: Not on file   Occupational History    Not on file   Tobacco Use    Smoking status: Every Day     Packs/day: 1.00     Years: 9.00     Pack years: 9.00     Types: Cigarettes    Smokeless tobacco: Never    Tobacco comments: Will see PCP PRN cessation needs.    Substance and Sexual Activity    Alcohol use: Not Currently    Drug use: No    Sexual activity: Not on file   Other Topics Concern    Not on file   Social History Narrative    Not on file     Social Determinants of Health     Financial Resource Strain: Not on file   Food Insecurity: Not on file   Transportation Needs: Not on file   Physical Activity: Not on file   Stress: Not on file   Social Connections: Not on file   Intimate Partner Violence: Not on file Housing Stability: Not on file       Vitals:    08/18/22 1111   BP: 126/84         Wt Readings from Last 3 Encounters:   08/18/22 213 lb (96.6 kg)   05/12/22 218 lb (98.9 kg)   02/17/22 218 lb 4 oz (99 kg)         BP Readings from Last 3 Encounters:   08/18/22 126/84   05/12/22 132/82   02/17/22 130/88           Hematology and Coagulation    Lab Results   Component Value Date/Time    WBC 8.3 02/01/2022 12:56 PM    RBC 4.10 02/01/2022 12:56 PM    HGB 12.9 02/01/2022 12:56 PM    HCT 37.9 02/01/2022 12:56 PM    MCV 92.4 02/01/2022 12:56 PM    MCH 31.5 02/01/2022 12:56 PM    MCHC 34.0 02/01/2022 12:56 PM    RDW 12.5 02/01/2022 12:56 PM     02/01/2022 12:56 PM    MPV 9.8 02/01/2022 12:56 PM       No results found for: ESR    Chemistries    Lab Results   Component Value Date/Time     02/01/2022 12:56 PM    K 4.2 02/01/2022 12:56 PM     02/01/2022 12:56 PM    CO2 24 02/01/2022 12:56 PM    BUN 13 02/01/2022 12:56 PM    CREATININE 0.70 02/01/2022 12:56 PM    CALCIUM 8.8 02/01/2022 12:56 PM    PROT 6.7 02/01/2022 12:56 PM    LABALBU 3.9 02/01/2022 12:56 PM    BILITOT 0.20 02/01/2022 12:56 PM    ALKPHOS 75 02/01/2022 12:56 PM    AST 15 02/01/2022 12:56 PM    ALT 17 02/01/2022 12:56 PM     Lab Results   Component Value Date/Time    ALKPHOS 75 02/01/2022 12:56 PM    ALT 17 02/01/2022 12:56 PM    AST 15 02/01/2022 12:56 PM    PROT 6.7 02/01/2022 12:56 PM    BILITOT 0.20 02/01/2022 12:56 PM    LABALBU 3.9 02/01/2022 12:56 PM     Lab Results   Component Value Date/Time    BUN 13 02/01/2022 12:56 PM    CREATININE 0.70 02/01/2022 12:56 PM     Lab Results   Component Value Date/Time    CALCIUM 8.8 02/01/2022 12:56 PM     Lab Results   Component Value Date/Time    AST 15 02/01/2022 12:56 PM    ALT 17 02/01/2022 12:56 PM     No components found for: ZMSAV2J  No results found for: URICACID  No results found for: CKTOTAL  Lab Results   Component Value Date/Time    [id number] 310 02/01/2022 12:56 PM         Review of Systems   Constitutional:  Negative for appetite change, chills, diaphoresis, fatigue, fever and unexpected weight change. HENT:  Negative for congestion, dental problem, drooling, ear discharge, ear pain, facial swelling, hearing loss, mouth sores, nosebleeds, postnasal drip, sinus pressure, sore throat, tinnitus, trouble swallowing and voice change. Eyes:  Negative for photophobia, pain, discharge, redness, itching and visual disturbance. Respiratory:  Negative for apnea, cough, choking, chest tightness, shortness of breath and wheezing. Cardiovascular:  Negative for chest pain, palpitations, leg swelling and near-syncope. Gastrointestinal:  Negative for abdominal distention, abdominal pain, blood in stool, constipation, diarrhea, nausea and vomiting. Endocrine: Negative for cold intolerance, heat intolerance, polydipsia, polyphagia and polyuria. Genitourinary:  Negative for bladder incontinence. Musculoskeletal:  Negative for arthralgias, back pain, gait problem, joint swelling, myalgias, neck pain and neck stiffness. Skin:  Negative for color change, pallor, rash and wound. Allergic/Immunologic: Negative for environmental allergies, food allergies and immunocompromised state. Neurological:  Positive for dizziness. Negative for vertigo, tremors, focal weakness, seizures, syncope, facial asymmetry, speech difficulty, weakness, light-headedness, numbness, headaches and loss of balance. EPISODE  OF  VERTIGO. LEFT   TRIGEMINAL  NEURALGIA    Hematological:  Negative for adenopathy. Does not bruise/bleed easily. Psychiatric/Behavioral:  Negative for agitation, behavioral problems, confusion, decreased concentration, dysphoric mood, hallucinations, memory loss, self-injury, sleep disturbance and suicidal ideas. The patient is not nervous/anxious and is not hyperactive. OBJECTIVE:      Physical Exam  Constitutional:       Appearance: She is well-developed.    HENT:      Head: Normocephalic and atraumatic. No raccoon eyes or Berumen's sign. Right Ear: External ear normal.      Left Ear: External ear normal.      Nose: Nose normal.   Eyes:      Conjunctiva/sclera: Conjunctivae normal.      Pupils: Pupils are equal, round, and reactive to light. Neck:      Thyroid: No thyroid mass or thyromegaly. Vascular: No carotid bruit. Trachea: No tracheal deviation. Meningeal: Brudzinski's sign and Kernig's sign absent. Cardiovascular:      Rate and Rhythm: Normal rate and regular rhythm. Pulmonary:      Effort: Pulmonary effort is normal.   Musculoskeletal:         General: No tenderness. Normal range of motion. Cervical back: Normal range of motion and neck supple. No rigidity. No muscular tenderness. Normal range of motion. Skin:     General: Skin is warm. Coloration: Skin is not pale. Findings: No erythema or rash. Nails: There is no clubbing. Psychiatric:         Attention and Perception: She is attentive. Mood and Affect: Mood is not anxious or depressed. Affect is not labile, blunt or inappropriate. Speech: She is communicative. Speech is not rapid and pressured, delayed, slurred or tangential.         Behavior: Behavior is not agitated, slowed, aggressive, withdrawn, hyperactive or combative. Behavior is cooperative. Thought Content: Thought content is not paranoid or delusional. Thought content does not include homicidal or suicidal ideation. Thought content does not include homicidal or suicidal plan. Cognition and Memory: Memory is not impaired. She does not exhibit impaired recent memory or impaired remote memory. Judgment: Judgment is not impulsive or inappropriate. NEUROLOGICALEXAMINATION :       A) MENTAL STATUS:                   Alert and  oriented  To time, place  And  Person. No Aphasia. No  Dysarthria.                     Able   To  Follow     SIMPLE    commands Deep  Tendon  Reflexes   normal                  No  pathological  Reflexes  Bilaterally. F) COORDINATION  AND  GAIT :                                Station and  Gait  normal                              Romberg 's test negative                          Ataxia negative          ASSESSMENT:        Patient Active Problem List   Diagnosis    Murmur, cardiac    Mild aortic regurgitation    Palpitations    Meniere's disease    Temporomandibular joint disorder (TMJ)    Trigeminal neuralgia of left side of face    Smoker    Obesity (BMI 30-39. 9)    Other chronic sinusitis             MRI OF THE BRAIN WITHOUT AND WITH CONTRAST  2/1/2022 1:08 pm       TECHNIQUE:   Multiplanar multisequence MRI of the head/brain was performed without and   with the administration of intravenous contrast.       COMPARISON:   None available       HISTORY:   ORDERING SYSTEM PROVIDED HISTORY: Murmur, cardiac   TECHNOLOGIST PROVIDED HISTORY:   Is the patient pregnant?->No   Reason for Exam: Left side trigeminal neuralgia since 2015   Additional signs and symptoms: Trigeminal neuralgia of left side of face;   Meniere's disease, unspecified laterality       FINDINGS:   INTRACRANIAL STRUCTURES/VENTRICLES:  The cerebral and cerebellar parenchyma   demonstrate normal volume. No abnormal extra-axial fluid collection. The   ventricles are proportional to the cerebral sulci. Normal major intracranial   flow voids are noted. No areas of abnormal enhancement. No areas of restricted diffusion. IACs: The 7th and 8th cranial nerves are normal in appearance. The cochlea,   vestibule, and semicircular canals are unremarkable. The trigeminal nerves   are normal in appearance. The cavernous sinuses are symmetric. The infundibulum is midline. No areas   of abnormal enhancement along the cerebellopontine angles or brainstem   cisterns.        ORBITS: The visualized portion of the orbits demonstrate no acute abnormality. SINUSES: There is scattered mild paranasal sinus disease. Trace bilateral   mastoid effusions. BONES/SOFT TISSUES: The bone marrow signal intensity and craniocervical   junction are unremarkable. Pituitary gland is normal in appearance. Impression   1. Unremarkable MRI of the brain and IACs. 2. Scattered paranasal sinus disease with trace bilateral mastoid effusions. VISITING DIAGNOSIS:      ICD-10-CM    1. Trigeminal neuralgia of left side of face  G50.0       2. Other chronic sinusitis  J32.8       3. Obesity (BMI 30-39. 9)  E66.9       4. Murmur, cardiac  R01.1       5. Smoker  F17.200       6. Mild aortic regurgitation  I35.1       7. Temporomandibular joint disorder (TMJ)  M26.609       8. Meniere's disease, unspecified laterality  H81.09                            VARIOUS  RISK   FACTORS   WERE  REVIEWED   AND   DISCUSSED. PLAN:                         Frank Cadein  Of  The  Diagnoses,  The  Management & Treatment  Options            AND    Care  plan  Were          Reviewed and   Discussed   With  patient. * Goals  And  Expectations  Of  The  Therapy  Discussed   And  Reviewed. *   Benefits   And   Side  Effect  Profile  Of  Medication/s   Were   Discussed                * Need   For  Further   Follow up For  The  Various  Problems Were  discussed. * Results  Of  The  Previous  Diagnostic tests were reviewed and  discussed                   Medical  Decision  Making  Was  Made  Based on the   Complexity  Of  Above  Mentioned  Diagnoses,    Data reviewed             And    Risk  Of  Significant   Co morbidities and   complicating   Factors.                  Medical  Decision  Was    Moderate   Complexity   Due   To  The  Patient's  Multiple  Symptoms  &  Disease,            Complex  Treatment  Regimen,  Multiple medications           and   Risk  Of   Side  Effects,  Difficulty  In  Medication  Management  And Diagnostic  Challenges           In  View  Of  The  Associated   Co  Morbid  Conditions   And  Problems. *   ADEQUATE   FLUID  INTAKE   AND  ELECTROLYTE  BALANCE           * KEEP  DAIRY  OF   THE  NEUROLOGICAL  SYMPTOMS        RECORDING THE    DURATION  AND  FREQUENCY. *  AVOID    CONDITIONS  AND  FACTORS   THAT  MAKE                  NEUROLOGICAL  SYMPTOMS  WORSE.                    *TO  MAINTAIN  REGULAR  SLEEP  WAKE  CYCLES. *   TO  HAVE  ADEQUATE  REST  AND   SLEEP    HOURS.          *    TO   AVOID   TO  SLEEP  IN   SUPINE  POSITION. *      WEIGHT   LOSS. *    AVOID  ANY USAGE OF    TOBACCO,          AVOID  EXCESSIVE  ALCOHOL  AND   ILLEGAL   SUBSTANCES          *  CONTINUE   MEDICATIONS    PRESCRIBED       AS    RECOMMENDED       *   Compliance   With  Medications   And  Instructions          * CURRENTLY    TOLERATING  THE  PRESCRIBED   MEDICATIONS. WITHOUT  ANY  SIGNIFICANT  SIDE  EFFECTS   &  GETTING BENEFIT.         *    Antiplatelet  therapy    As   Recommended  Was   Discussed      *    Prophylactic  Use   Of     Vitamin   B   Complex,  Folic  Acid,    Vitamin  B12    Multivitamin,       Calcium  With  magnesium  And  Vit D    Supplementations   Over  The  Counter  Discussed                *  EVALUATIONS  AND  FOLLOW UP:                       *CARDIOLOGY                 *   DENTAL                     * ENT                                *   PATIENT      HAD    MRI  BRAIN IN  FEB. 2022    SHOWED                                   A)     UN  REMARKABLE    MRI  BRETT   AND  IACs                               B)    Scattered   Paranasal  Sinus  Disease     And                                           Trace  Bilateral   Mastoid  Effusions                              LABS    INCLUDING     TEGRETOL   LEVELS                                      ARE    WITH  IN  NORMAL  LIMITS                              -   RESULTS   REVIEWED    WITH  PATIENT  IN DETAIL                          *     RECOMMENDED  :                                    A)     TO  CONTINUE   TEGRETOL   400  MG  BID                                B)    ADD   NEURONTIN   300   MG    EVENING  AND  BEDTIME  AS NEEDED                                          FOR  SYMPTOMATIC    RELIEF                                                           VARIOUS  RISK   FACTORS   WERE  REVIEWED   AND   DISCUSSED. Controlled Substances Monitoring: Periodic Controlled Substance Monitoring: Possible medication side effects, risk of tolerance/dependence & alternative treatments discussed. , Assessed functional status. Pauline Falcon MD)            Orders Placed This Encounter   Medications    DISCONTD: carBAMazepine (CARBATROL) 200 MG extended release capsule     Sig: take 2 capsules by mouth twice a day     Dispense:  60 capsule     Refill:  5    gabapentin (NEURONTIN) 300 MG capsule     Sig: TAKE   ONE    CAPSULE     IN   THE  EVENING  AND      AT  BED   TIME   AS  NEEDED     Dispense:  60 capsule     Refill:  1    carBAMazepine (CARBATROL) 200 MG extended release capsule     Sig: take 2 capsules by mouth twice a day     Dispense:  120 capsule     Refill:  5                            *PATIENT   TO  FOLLOW  UP  WITH   PRIMARY  CARE         OTHER  CONSULTANTS  AS  BEFORE. *  Maintain   Healthy  Life Style    With   Periodic  Monitoring  Of          Any  Medical  Conditions  Including   Elevated  Blood  Pressure,  Lipid  Profile,        Blood  Sugar levels  AndHeart  Disease. *   Period   Screening  For  Cancers  Involving  Breast,  Colon,         Lungs  And  Other  Organs  As  Applicable,  In consultation   With  Your  Primary Care Providers. *Second  Neurological  Opinion  And  Evaluations  In  [facility]  Setting  If  Patient  Is  Interested.                  * Please   Contact   Neurology  Clinic   Early   If   Are Any  New  Neurological   And  Any neurological  Concerns. *  If  The  Patient remains  Neurologically  Stable   Return   To  [facility] Neurology Department   IN      3   MONTHS  TIME   FOR  FURTHER              FOLLOW UP.                       *   The  Neurological   Findings,  Possible  Diagnosis,  Differential diagnoses                    And  Options  For    Further   Investigations                   And  management   Are  Discussed  Comprehensively. Medications   And  Prescription   Risks  And  Side effects  Are   Also  Discussed. *  If   There is  Any  Significant  Worsening   Of  Current  Symptoms  And  Or                  If patient  Develops   Any additional  New  NeurologicalSymptoms                  Or  Significant  Concerns   Should  Call  911 or  Go  To  Emergency  Department                  For  Further  Immediate  Evaluation and  management . The   Above  Were  Reviewed  With  PATIENT   and                          questions  Answered  In  Detail. Electronically signed by   Mohamud Zarate M.D., Felipa Hernandez. Board Certified in  Neurology &  In  [employer] [employer] ([employer])      DISCLAIMER:   Although every effort was made to ensure the accuracy of this  electronictranscription, some errors in transcription may have occurred. GENERAL PATIENT INSTRUCTIONS:     A Healthy Lifestyle: Care Instructions  Your Care Instructions  A healthy lifestyle can help you feel good, stay at ahealthy weight, and have plenty of energy for both work and play. A healthy lifestyle is something you can share with your whole family. A healthy lifestyle also can lower your risk for serious health problems, such ashigh blood pressure, heart disease, and diabetes.   You can follow a few steps listed below to improve your health and the health of your family. Follow-up careis a key part of your treatment and safety. Be sure to make and go to all appointments, and call your doctor if you are having problems. Its also a good idea to know your test results and keep a list of the medicines you take. How can you care for yourself at home? Do not eat too much sugar, fat, or fast foods. You can still have dessert and treats nowand then. The goal is moderation. Start small to improve your eating habits. Pay attention to portion sizes, drink less juice and soda pop, and eat more fruits and vegetables. Eat a healthy amount of food. A 3-ounce serving of meat, for example, is about the size of a deck of cards. Fill the rest of your plate with vegetables and whole grains. Limit theamount of soda and sports drinks you have every day. Drink more water when you are thirsty. Eat at least 5 servings of fruits and vegetables every day. It may seem like a lot, but it is not hard to reach this goal. Aserving or helping is 1 piece of fruit, 1 cup of vegetables, or 2 cups of leafy, raw vegetables. Have an apple or some carrot sticks as an afternoon snack instead of a candy bar. Try to have fruits and/or vegetables at everymeal.  Make exercise part of your daily routine. You may want to start with simple activities, such as walking, bicycling, or slow swimming. Try shon active 30 to 60 minutes every day. You do not need to do all 30 to 60 minutes all at once. For example, you can exercise 3 times a day for 10 or 20 minutes. Moderate exercise is safe for most people, but it is always agood idea to talk to your doctor before starting an exercise program.  Keep moving. [place] the lawn, work in the garden, or [employer]. Take the stairs instead of the elevator at work. If you smoke, quit. Peoplewho smoke have an increased risk for heart attack, stroke, cancer, and other lung illnesses.  Quitting is hard, but there are ways to boost your chance of quitting tobacco for good. Use nicotine gum, patches, or lozenges. Ask your doctor about stop-smoking programs and medicines. Keep trying. In addition to reducing your risk of diseases in the future, you will notice some benefits soon after you stop using tobacco. If you have shortness of breath or asthma symptoms, they will likely getbetter within a few weeks after you quit. Limit how much alcohol you drink. Moderate amounts of alcohol (up to 2 drinks a day for men, 1drink a day for women) are okay. But drinking too much can lead to liver problems, high blood pressure, and other health problems. health  If you have a family, there are many things you can do together to improve your health. Eat meals together as a family as often as possible. Eat healthy foods. This includes fruits, vegetables, lean meats and dairy, and whole grains. Include your family in your fitness plan. Most peoplethink of activities such as jogging or tennis as the way to fitness, but there are many ways you and your family can be more active. Anything that makes you breathe hard and gets your heart pumping is exercise. Here are sometips:  Walk to do errands or to take your child to school or the bus. Go for a family bike ride after dinner instead of watching TV. Where can you learn more? Go toP[employer]oc[employer]://[employer].Ortho-tag. org and sign in to your [employer] account. Enter W813 in the Search HealthInformation box to learn more about \"A Healthy Lifestyle: Care Instructions. \"     If you do not have anaccount, please click on the \"Sign Up Now\" link. Current as of: July 26, 2016  Content Version: 11.2  © 3764-4436 [employer]. Care instructions adapted under license by [facility] ([facility]). If you have questions about a medical condition or this instruction, always ask your healthcare professional. [employer] disclaims any warranty or liability for your use of this information.

## 2022-09-21 ENCOUNTER — E-VISIT (OUTPATIENT)
Dept: PRIMARY CARE CLINIC | Age: 52
End: 2022-09-21
Payer: MEDICARE

## 2022-09-21 DIAGNOSIS — J01.90 ACUTE NON-RECURRENT SINUSITIS, UNSPECIFIED LOCATION: Primary | ICD-10-CM

## 2022-09-21 PROCEDURE — 99422 OL DIG E/M SVC 11-20 MIN: CPT | Performed by: NURSE PRACTITIONER

## 2022-09-21 RX ORDER — AMOXICILLIN AND CLAVULANATE POTASSIUM 875; 125 MG/1; MG/1
1 TABLET, FILM COATED ORAL 2 TIMES DAILY
Qty: 20 TABLET | Refills: 0 | Status: SHIPPED | OUTPATIENT
Start: 2022-09-21 | End: 2022-10-01

## 2022-09-21 ASSESSMENT — LIFESTYLE VARIABLES
SMOKING_STATUS: YES
SMOKING_YEARS: 20

## 2022-09-21 NOTE — PROGRESS NOTES
Reviewed questionnaire  Reviewed previous encounters, medications, allergies and history     Dx sinusitis     Plan   rx for augmentin 875-125 mg BID x 10 days     1. Water: Drink 1/2 of body weight (lb) in ounces,  Up to 90 Ounces of water per day. This will loosen mucus in the head and chest & improve the weak feeling of dehydration, allow the body to get germ fighting resources to the infection. Half can be juice or sugar free powerade or garorate. Don't count drinks with caffeine or carbonation. Infants can have Pedialyte liquid or freezer pops. Avoid salt if you have high Blood Pressure, swelling in the feet or ankles or have heart problems. 2. Humidity: Humidify the air to 35-50% ( or until the windows fog over slightly). Summer use of an air conditioner turned down too far and can result in dry air. Can use a humidifier, vaporizer, boil water on the stove or put a coffee can full of water on the heater vents. This will loosen mucus from infections and allergies. 3. Sleep: Get 8-10 hours a night and rest during the evening after work or school. If you have trouble sleeping, adults can take Melatonin 5mg up to 2 tabs at bedtime ( not for children or pregnant women). If Mono is suspected then sleep during 9PM to 9AM time span (if possible.)   4. Cough: Take cough medicines with Guaifenesin ( to loosen chest or head congestion) and Dextromethorphan ( to decrease excess cough). Robitussin D.M. Syrup every 4-6 hrs or Mucinex D. M. pills twice a day. Use the pediatric formulations for children over 6 months making sure they are alcohol & sugar free for children, pregnant women, and diabetics. 5. Pain And Fevers: Take Acetaminophen ( Tylenol) for fevers, aches, and headaches. 2-500 mg every 8 hours for adults. Appropriate doses at bedtime for children may help them sleep better. Ibuprofen may be used if not pregnant, but should be given with food to avoid nausea.  Avoid Ibuprofen if you have high blood pressure, CHF, or kidney problems. 6.Gargle: (DAY ONE OF SYMPTOMS) Gargle in the back of the throat with the head tilted back and to the sides with a strong mouthwash  ( Listerine or Scope) after meals and at bedtime at least 4 -5 times a day. This helps kill bacteria and viruses in the back of the throat and will shorten the duration and decrease the severity of your symptoms: sore throat, cough, ear popping,/ear pain, and possibly dizziness. 7. Smoking: Avoid smoking or exposure to second hand smoke. 8. Zinc: (DAY ONE OF SYMPTOMS)  Zinc lozenges such as Cold Jake (available most stores), or Basic ([employer] brand) will help shorten the duration and lessen symptoms such as sore throat, cough, nasal congestion, runny nose, and post nasal drip. Use 1 lozenge every 2-4 hours ( after meals if stomach is sensitive). Children can use 10-15 mg or less 3-4 times a day or Zinc lollypops. In pregnancy limit to 50-60 mg a day for 7 days as prenatals have Zinc also. With diarrhea use zinc pills 50 mg 1/2 to 1 pill 2x/day. 9. Vitamins: Vitamin C 500 mg with breakfast and dinner. Children and pregnant women should drink citrus juices. This speeds healing and strengthens immune system. 10. Chest Symptoms: Vicks Vapor rub to the chest at bedtime. 11. Decongestants: Avoid all decongestants if you have high blood pressure. Safe to take if you do not have high blood pressure. Try all of the above starting with day 1 of symptoms. If Strep throat symptoms appear call to be seen in the office as soon as possible and don't gargle on that day. Newborns, infants, or anyone with earaches or influenza may need to be seen quickly. Adults with fevers over 103 degrees or shortness of breath should call the office immediately. 12. Nasal saline spray or rinse. There are many different ways to do this OTC.       Time spent 14 minutes

## 2022-09-30 ENCOUNTER — E-VISIT (OUTPATIENT)
Dept: PRIMARY CARE CLINIC | Age: 52
End: 2022-09-30
Payer: MEDICARE

## 2022-09-30 DIAGNOSIS — N89.8 VAGINAL DISCHARGE: Primary | ICD-10-CM

## 2022-09-30 PROCEDURE — 99422 OL DIG E/M SVC 11-20 MIN: CPT | Performed by: NURSE PRACTITIONER

## 2022-09-30 RX ORDER — FLUCONAZOLE 150 MG/1
150 TABLET ORAL ONCE
Qty: 1 TABLET | Refills: 1 | Status: SHIPPED | OUTPATIENT
Start: 2022-09-30 | End: 2022-09-30

## 2022-09-30 NOTE — PROGRESS NOTES
Reviewed questionnaire    Reviewed meds/allergies    Dx Vaginal discharge    Plan Rx given for diflucan, follow up with PCP if no improvement    Time spent on visit 11 min

## 2022-10-13 DIAGNOSIS — G50.0 TRIGEMINAL NEURALGIA OF LEFT SIDE OF FACE: Primary | ICD-10-CM

## 2022-10-13 RX ORDER — GABAPENTIN 300 MG/1
CAPSULE ORAL
Qty: 60 CAPSULE | Refills: 1 | Status: SHIPPED | OUTPATIENT
Start: 2022-10-13 | End: 2023-01-19

## 2022-10-13 NOTE — TELEPHONE ENCOUNTER
Last Appt:  8/18/2022  Next Appt:   11/10/2022  Med verified in Epic     Patient needs refill on Gabapentin

## 2022-12-21 DIAGNOSIS — G50.0 TRIGEMINAL NEURALGIA OF LEFT SIDE OF FACE: ICD-10-CM

## 2022-12-21 RX ORDER — GABAPENTIN 300 MG/1
CAPSULE ORAL
Qty: 60 CAPSULE | Refills: 1 | Status: SHIPPED | OUTPATIENT
Start: 2022-12-21 | End: 2023-03-29

## 2023-02-16 RX ORDER — CARBAMAZEPINE 200 MG/1
CAPSULE, EXTENDED RELEASE ORAL
Qty: 120 CAPSULE | Refills: 5 | Status: SHIPPED | OUTPATIENT
Start: 2023-02-16

## 2023-02-17 DIAGNOSIS — G50.0 TRIGEMINAL NEURALGIA OF LEFT SIDE OF FACE: ICD-10-CM

## 2023-02-17 RX ORDER — GABAPENTIN 300 MG/1
CAPSULE ORAL
Qty: 60 CAPSULE | Refills: 0 | Status: SHIPPED | OUTPATIENT
Start: 2023-02-17 | End: 2023-05-26

## 2023-02-17 NOTE — TELEPHONE ENCOUNTER
PATIENT'S     MESSAGE    A   REVIEWED. PATIENT  NEEDS  FOLLOW  UP  RE EVALUATIONS ,                                PLEASE   NOTIFY   THE  PATIENT           OR   AUTHORIZED &  RESPONSIBLE FAMILY MEMBER. THANK   YOU.

## 2023-02-17 NOTE — TELEPHONE ENCOUNTER
Last Appt:  8/18/2022  Next Appt:   3/9/2023  Med verified in Epic     PATIENT REQUESTING [address]

## 2023-03-09 ENCOUNTER — OFFICE VISIT (OUTPATIENT)
Dept: NEUROLOGY | Age: 53
End: 2023-03-09
Payer: MEDICAID

## 2023-03-09 VITALS
RESPIRATION RATE: 16 BRPM | HEART RATE: 69 BPM | BODY MASS INDEX: 38.62 KG/M2 | SYSTOLIC BLOOD PRESSURE: 130 MMHG | DIASTOLIC BLOOD PRESSURE: 80 MMHG | WEIGHT: 218 LBS | OXYGEN SATURATION: 98 %

## 2023-03-09 DIAGNOSIS — G50.0 TRIGEMINAL NEURALGIA OF LEFT SIDE OF FACE: ICD-10-CM

## 2023-03-09 DIAGNOSIS — Z87.891 EX-SMOKER: Primary | ICD-10-CM

## 2023-03-09 DIAGNOSIS — E66.9 OBESITY (BMI 30-39.9): ICD-10-CM

## 2023-03-09 DIAGNOSIS — I35.1 MILD AORTIC REGURGITATION: ICD-10-CM

## 2023-03-09 PROCEDURE — 99212 OFFICE O/P EST SF 10 MIN: CPT | Performed by: PSYCHIATRY & NEUROLOGY

## 2023-03-09 RX ORDER — GABAPENTIN 300 MG/1
CAPSULE ORAL
Qty: 60 CAPSULE | Refills: 2 | Status: SHIPPED | OUTPATIENT
Start: 2023-03-09 | End: 2023-06-15

## 2023-03-09 ASSESSMENT — PATIENT HEALTH QUESTIONNAIRE - PHQ9
1. LITTLE INTEREST OR PLEASURE IN DOING THINGS: 0
SUM OF ALL RESPONSES TO PHQ QUESTIONS 1-9: 0
2. FEELING DOWN, DEPRESSED OR HOPELESS: 0
SUM OF ALL RESPONSES TO PHQ9 QUESTIONS 1 & 2: 0
SUM OF ALL RESPONSES TO PHQ QUESTIONS 1-9: 0

## 2023-03-09 ASSESSMENT — ENCOUNTER SYMPTOMS
APNEA: 0
CHEST TIGHTNESS: 0
WHEEZING: 0
SORE THROAT: 0
SINUS PRESSURE: 0
SHORTNESS OF BREATH: 0
VOICE CHANGE: 0
FACIAL SWELLING: 0
COUGH: 0
CHOKING: 0
TROUBLE SWALLOWING: 0

## 2023-03-09 NOTE — PROGRESS NOTES
[facility]  Neurology    [address] [address]  ADBGO:[phone]   Fax: [phone]        SUBJECTIVE:         PATIENT ID:  Oneyda Damico is a  RIGHT    HANDED 50 y.o. female. Neurologic Problem  The patient's pertinent negatives include no clumsiness, focal sensory loss or slurred speech. Primary symptoms comment: LEFT   TRIGEMINAL  NEURALGIA    IN  MAXILLARY    AREA   SINCE    2016  . This is a chronic problem. The neurological problem developed gradually. The problem has been waxing and waning since onset. There was no focality noted. Pertinent negatives include no auditory change, aura or shortness of breath. Treatments tried: TEGRETAL  The treatment provided moderate relief. There is no history of a bleeding disorder, a clotting disorder, a CVA, dementia, head trauma, liver disease or seizures. History obtained from  The   PATIENT         and other  available   medical  records   were  Also  reviewed. The  Duration,  Quality,  Severity,  Location,  Timing,  Context,  Modifying  Factors   Of   The   Chief   Complaint       And  Present  Illness  Was   Reviewed   In   Chronological   Manner. PATIENT'S  MAIN  CONCERNS INCLUDE :                       1)     H/O   CHRONIC     TRIGEMINAL  NEURALGIA     ON  LEFT     FACE                                            MAXILLARY  DIVISION      SINCE   2016                                      PREVIOUS    H/O    DENTAL    EXTRACTION    IN   2016                                   AND    ALSO   HAD   FOLLOW   UP                                                                2)     NO     H/O   TRAUMA    TO   FACE /  HEAD  AND  NECK  .                               3)     PATIENT      HAD   NEUROLOGY   EVALUATIONS      IN                                   [facility]         AND     BEING       ON                                       TEGRETOL    XR    400 MG   BID      SINCE     2017                            4)      HAD    MRI   EVALUATIONS     IN    SEPT. 2016                                AND   IN    OCT. 2020       WITH   AND  W/O   CONTRAST                                    SHOWED   NO  SIGNIFICANT   ABNORMALITIES                                     -    REPORTS     REVIEWED       ON      1/21/2022                            5)     H/O     HYPERSENSITIVE    AND  BURNING     SENSATION                                    LEFT    LOWER   MAXILLARY   AREA                                      -    WELL   CONTROLLED                                     6)    H/O     CHRONIC  SMOKING                    PATIENT  AWARE  OF  RISKS  AND                 SIDE  EFFECTS  OF   SMOKING   DISCUSSED. PATIENT   ADVISED   AND  COUNSELED    TO   QUIT  SMOKING.                        7)       PREVIOUS     H/O   PALPITATIONS,    CARDIAC   MURMUR                               AND  MILD    AORTIC     REGURGITATION                                     -   BEING       FOLLOWED  BY    CARDIOLOGY                           8)      H/O     EPISODIC   VERTIGO      AND  MENIERE'S   DISEASE                                    HAD     ENT      EVALUATIONS                                     -   WAS        ON      HYDROCHLOROTHIAZIDE                                       -    IMPROVED                                                       9)         H/O      MILD     OBESITY                                                 10)      PATIENT      HAD    MRI  BRAIN IN  FEB. 2022    SHOWED                                   A)     UN  REMARKABLE    MRI  BRETT   AND  IACs                               B)    Scattered   Paranasal  Sinus  Disease     And                                           Trace  Bilateral   Mastoid  Effusions                              LABS    INCLUDING     TEGRETOL   LEVELS                                      ARE    WITH  IN  NORMAL  LIMITS -   RESULTS   REVIEWED    WITH  PATIENT  IN  DETAIL                          11)     TRIGEMINAL  NEURALGIA       SYMPTOMS    ARE    FAIRLY                                 WELL  CONTROLLED       WITH    CURRENT  MEDICAL    MANAGEMENT                              PATIENT  DENIES     ANY   NEW  NEUROLOGICAL    CONCERNS. RECOMMENDED  :                                    A)     TO  CONTINUE   TEGRETOL   400  MG  BID                                B)      NEURONTIN   300   MG    EVENING  AND  BEDTIME  AS NEEDED                                          FOR  SYMPTOMATIC    RELIEF                                 C)    FOLLOW UP   WITH  PCP    AND  ENT                                       VARIOUS  RISK   FACTORS   WERE  REVIEWED   AND   DISCUSSED.                                                   PRECIPITATING  FACTORS: including  fever/infection, exertion/relaxation, position change, stress,                weather change,   medications/alcohol, time of day/darkness/light  Are  absent                                                          MODIFYING  FACTORS:  fever/infection, exertion/relaxation, position change, stress, weather change,               medications/alcohol, time of day/darkness/light  Are  absent                Patient   Indicates   The  Presence   And  The  Absence  Of  The  Following    Associated  And             Additional  Neurological    Symptoms:                                Balance  And coordination   problems  absent           Gait problems     absent            Headaches      absent              Migraines           absent           Memory problemsabsent              Confusion        absent            Paresthesia   numbness          present           Seizures  And  Starring  Episodes           absent           Syncope,  Near  syncopal episodes         absent           Speech   problems           absent             Swallowing   Problems      absent Dizziness,  Light headedness           absent              Vertigo        absent             Generalized   Weakness    absent              focal  Weakness     absent             Tremors         absent              Sleep  Problems     absent             History  Of   Recent  Head  Injury     absent             History  Of   Recent  TIA     absent             History  Of   Recent    Stroke     absent             Neck  Pain   and   Neck muscle  Spasms  absent               Radiating  down   And   Weakness           absent            Lower back   Pain  And     Spasms  absent              Radiating    Down   And   Weakness          absent                H/OFALLS        absent               History  Of   Visual  Symptoms    absent                  Associated   Diplopia       absent                                               Also   Additional   Symptoms   Present    As  Documented    In   The   detailed                  Review  Of  Systems   And    Please   Refer   To    Them for   Additional    Information.                    Any components  That are either  Unobtainable  Or  Limited  In   HPI, ROS  And/or PFSH   Are                   Due   ToPatient's  Medical  Problems,  Clinical  Condition   and/or lack of                                 other    Alternate   resources.            RECORDS   REVIEWED:    historical medical records           INFORMATION   REVIEWED:     MEDICAL   HISTORY,SURGICAL   HISTORY,     MEDICATIONS   LIST,   ALLERGIES AND  DRUG  INTOLERANCES,       FAMILY   HISTORY,  SOCIAL  HISTORY,      PROBLEM  LIST   FOR  PATIENT  CARE   COORDINATION          Past Medical History:   Diagnosis Date    Meniere's disease     Mild aortic regurgitation     with aortic insufficiency on echo 7/8/15    Murmur, cardiac     Palpitations     Temporomandibular joint disorder (TMJ)          Past Surgical History:   Procedure Laterality Date    TUBAL LIGATION           Current Outpatient Medications   Medication Sig  Dispense Refill    gabapentin (NEURONTIN) 300 MG capsule TAKE   ONE    CAPSULE     IN   THE  EVENING  AND      AT  BED   TIME   AS  NEEDED 60 capsule 2    carBAMazepine (CARBATROL) 200 MG extended release capsule take 2 capsules by mouth twice a day 120 capsule 5    amiodarone (CORDARONE) 200 MG tablet take 1 tablet by mouth twice a day      ibuprofen (ADVIL;MOTRIN) 200 MG tablet Take 200 mg by mouth as needed for Pain       No current facility-administered medications for this visit. Allergies   Allergen Reactions    Indomethacin Other (See Comments)     Other reaction(s): Vomitting, mental health issues           History reviewed. No pertinent family history.       Social History     Socioeconomic History    Marital status:      Spouse name: Not on file    Number of children: Not on file    Years of education: Not on file    Highest education level: Not on file   Occupational History    Not on file   Tobacco Use    Smoking status: Former     Packs/day: 1.00     Years: 9.00     Pack years: 9.00     Types: Cigarettes     Quit date: 11/15/2022     Years since quittin.3    Smokeless tobacco: Never    Tobacco comments:         Substance and Sexual Activity    Alcohol use: Not Currently    Drug use: No    Sexual activity: Not on file   Other Topics Concern    Not on file   Social History Narrative    Not on file     Social Determinants of Health     Financial Resource Strain: Not on file   Food Insecurity: Not on file   Transportation Needs: Not on file   Physical Activity: Not on file   Stress: Not on file   Social Connections: Not on file   Intimate Partner Violence: Not on file   Housing Stability: Not on file       Vitals:    23 1438   BP: 130/80   Pulse: 69   Resp: 16   SpO2: 98%         Wt Readings from Last 3 Encounters:   23 218 lb (98.9 kg)   22 213 lb (96.6 kg)   22 218 lb (98.9 kg)         BP Readings from Last 3 Encounters:   23 130/80   22 126/84 05/12/22 132/82           Hematology and Coagulation    Lab Results   Component Value Date/Time    WBC 8.3 02/01/2022 12:56 PM    RBC 4.10 02/01/2022 12:56 PM    HGB 12.9 02/01/2022 12:56 PM    HCT 37.9 02/01/2022 12:56 PM    MCV 92.4 02/01/2022 12:56 PM    MCH 31.5 02/01/2022 12:56 PM    MCHC 34.0 02/01/2022 12:56 PM    RDW 12.5 02/01/2022 12:56 PM     02/01/2022 12:56 PM    MPV 9.8 02/01/2022 12:56 PM       No results found for: ESR    Chemistries    Lab Results   Component Value Date/Time     02/01/2022 12:56 PM    K 4.2 02/01/2022 12:56 PM     02/01/2022 12:56 PM    CO2 24 02/01/2022 12:56 PM    BUN 13 02/01/2022 12:56 PM    CREATININE 0.70 02/01/2022 12:56 PM    CALCIUM 8.8 02/01/2022 12:56 PM    PROT 6.7 02/01/2022 12:56 PM    LABALBU 3.9 02/01/2022 12:56 PM    BILITOT 0.20 02/01/2022 12:56 PM    ALKPHOS 75 02/01/2022 12:56 PM    AST 15 02/01/2022 12:56 PM    ALT 17 02/01/2022 12:56 PM     Lab Results   Component Value Date/Time    ALKPHOS 75 02/01/2022 12:56 PM    ALT 17 02/01/2022 12:56 PM    AST 15 02/01/2022 12:56 PM    PROT 6.7 02/01/2022 12:56 PM    BILITOT 0.20 02/01/2022 12:56 PM    LABALBU 3.9 02/01/2022 12:56 PM     Lab Results   Component Value Date/Time    BUN 13 02/01/2022 12:56 PM    CREATININE 0.70 02/01/2022 12:56 PM     Lab Results   Component Value Date/Time    CALCIUM 8.8 02/01/2022 12:56 PM     Lab Results   Component Value Date/Time    AST 15 02/01/2022 12:56 PM    ALT 17 02/01/2022 12:56 PM     No components found for: IDPNR9L  No results found for: URICACID  No results found for: CKTOTAL  Lab Results   Component Value Date/Time    [id number] 310 02/01/2022 12:56 PM         Review of Systems   HENT:  Negative for congestion, dental problem, drooling, ear discharge, ear pain, facial swelling, hearing loss, mouth sores, nosebleeds, postnasal drip, sinus pressure, sore throat, tinnitus, trouble swallowing and voice change.     Respiratory:  Negative for apnea, cough, choking, chest tightness, shortness of breath and wheezing. Neurological:         EPISODE  OF  VERTIGO. LEFT   TRIGEMINAL  NEURALGIA        OBJECTIVE:      Physical Exam  Constitutional:       Appearance: She is well-developed. HENT:      Head: Normocephalic and atraumatic. No raccoon eyes or Berumen's sign. Right Ear: External ear normal.      Left Ear: External ear normal.      Nose: Nose normal.   Eyes:      Conjunctiva/sclera: Conjunctivae normal.      Pupils: Pupils are equal, round, and reactive to light. Neck:      Thyroid: No thyroid mass or thyromegaly. Vascular: No carotid bruit. Trachea: No tracheal deviation. Meningeal: Brudzinski's sign and Kernig's sign absent. Cardiovascular:      Rate and Rhythm: Regular rhythm. Pulmonary:      Effort: Pulmonary effort is normal.   Musculoskeletal:         General: No tenderness. Normal range of motion. Cervical back: Normal range of motion and neck supple. No rigidity. No muscular tenderness. Normal range of motion. Skin:     General: Skin is warm. Coloration: Skin is not pale. Findings: No erythema or rash. Nails: There is no clubbing. Psychiatric:         Attention and Perception: She is attentive. Mood and Affect: Mood is not anxious or depressed. Affect is not labile, blunt or inappropriate. Speech: She is communicative. Speech is not rapid and pressured, delayed, slurred or tangential.         Behavior: Behavior is not agitated, slowed, aggressive, withdrawn, hyperactive or combative. Behavior is cooperative. Thought Content: Thought content is not paranoid or delusional. Thought content does not include homicidal or suicidal ideation. Thought content does not include homicidal or suicidal plan. Cognition and Memory: Memory is not impaired. She does not exhibit impaired recent memory or impaired remote memory. Judgment: Judgment is not impulsive or inappropriate. NEUROLOGICALEXAMINATION :       A) MENTAL STATUS:                   Alert and  oriented  To time, place  And  Person. No Aphasia. No  Dysarthria. Able   To  Follow     SIMPLE    commands   without   Any  Difficulty. No right  To left confusion. Normal  Speech  And language function. Insight and  Judgment ,Fund  Of  Knowledge   within normal limits                Recent  And  Remote memory  within   normal limits                Attention &  Concentration are within   normal limits                                                   B) CRANIAL NERVES :        CN : Visual  Acuity  And  Visual fields  within normal limits               Fundi  Could  Not  Be  Could  Not  Be  Evaluated. 3,4,6 CN : Both  Pupils are   Reactive and  Equal.  Movements  Are  Intact. No  Nystagmus. No  HAZEL. No  Afferent  Pupillary  Defect noted. 5 CN :  Normal  Facial sensations and Corneal  Reflexes           7 CN:  Normal  Facial  Symmetry  And  Strength. No facial  Weakness. HYPERSENSITIVITY   ON  LEFT  SIDE  OF  FACE    IN  MAXILLARY  AREA           8 CN :  Hearing  Appears within normal limits          9, 10 CN: Normal   spontaneous, reflex   palate   movements         11 CN:   Normal  Shoulder  shrug and  strength         12 CN :   Normal  Tongue movements and  Tongue  In midline                        No tongue   Fasciculations or atrophy       C) MOTOR  EXAM:                 Strength  In upper  And  Lower   extremities   within   normal limits               No  Drift. No  Atrophy               Rapid   alternating  And  repetitions  Movements  within   normal limits                 Muscle  Tone  In upper  And  Lower  Extremities  normal                No rigidity. No  Spasticity. Bradykinesia   absent                 No  Asterixis. Sustention  Tremor , Resting   Tremor   absent                    No   other  Abnormal  Movements noted           D) SENSORY :               Light   touch, pinprick,   position  And  Vibration  within normal limits        E) REFLEXES:                   Deep  Tendon  Reflexes   normal                  No  pathological  Reflexes  Bilaterally. F) COORDINATION  AND  GAIT :                                Station and  Gait  normal                              Romberg 's test negative                          Ataxia negative          ASSESSMENT:        Patient Active Problem List   Diagnosis    Murmur, cardiac    Mild aortic regurgitation    Palpitations    Meniere's disease    Temporomandibular joint disorder (TMJ)    Trigeminal neuralgia of left side of face    Smoker    Obesity (BMI 30-39. 9)    Other chronic sinusitis             MRI OF THE BRAIN WITHOUT AND WITH CONTRAST  2/1/2022 1:08 pm       TECHNIQUE:   Multiplanar multisequence MRI of the head/brain was performed without and   with the administration of intravenous contrast.       COMPARISON:   None available       HISTORY:   ORDERING SYSTEM PROVIDED HISTORY: Murmur, cardiac   TECHNOLOGIST PROVIDED HISTORY:   Is the patient pregnant?->No   Reason for Exam: Left side trigeminal neuralgia since 2015   Additional signs and symptoms: Trigeminal neuralgia of left side of face;   Meniere's disease, unspecified laterality       FINDINGS:   INTRACRANIAL STRUCTURES/VENTRICLES:  The cerebral and cerebellar parenchyma   demonstrate normal volume. No abnormal extra-axial fluid collection. The   ventricles are proportional to the cerebral sulci. Normal major intracranial   flow voids are noted. No areas of abnormal enhancement. No areas of restricted diffusion. IACs: The 7th and 8th cranial nerves are normal in appearance. The cochlea,   vestibule, and semicircular canals are unremarkable.   The trigeminal nerves   are normal in appearance. The cavernous sinuses are symmetric. The infundibulum is midline. No areas   of abnormal enhancement along the cerebellopontine angles or brainstem   cisterns. ORBITS: The visualized portion of the orbits demonstrate no acute abnormality. SINUSES: There is scattered mild paranasal sinus disease. Trace bilateral   mastoid effusions. BONES/SOFT TISSUES: The bone marrow signal intensity and craniocervical   junction are unremarkable. Pituitary gland is normal in appearance. Impression   1. Unremarkable MRI of the brain and IACs. 2. Scattered paranasal sinus disease with trace bilateral mastoid effusions. VISITING DIAGNOSIS:      ICD-10-CM    1. Ex-smoker  Z87.891       2. Trigeminal neuralgia of left side of face  G50.0 gabapentin (NEURONTIN) 300 MG capsule      3. Obesity (BMI 30-39. 9)  E66.9       4. Mild aortic regurgitation  I35.1                            VARIOUS  RISK   FACTORS   WERE  REVIEWED   AND   DISCUSSED. PLAN:                         [place]  Of  The  Diagnoses,  The  Management & Treatment  Options            AND    Care  plan  Were          Reviewed and   Discussed   With  patient. * Goals  And  Expectations  Of  The  Therapy  Discussed   And  Reviewed. *   Benefits   And   Side  Effect  Profile  Of  Medication/s   Were   Discussed                * Need   For  Further   Follow up For  The  Various  Problems Were  discussed. * Results  Of  The  Previous  Diagnostic tests were reviewed and  discussed                   Medical  Decision  Making  Was  Made  Based on the   Complexity  Of  Above  Mentioned  Diagnoses,    Data reviewed             And    Risk  Of  Significant   Co morbidities and   complicating   Factors.                  Medical  Decision  Was    Moderate   Complexity   Due   To  The  Patient's  Multiple  Symptoms  &  Disease,            Complex  Treatment  Regimen,  Multiple medications and   Risk  Of   Side  Effects,  Difficulty  In  Medication  Management  And  Diagnostic  Challenges           In  View  Of  The  Associated   Co  Morbid  Conditions   And  Problems. *   ADEQUATE   FLUID  INTAKE   AND  ELECTROLYTE  BALANCE           * KEEP  DAIRY  OF   THE  NEUROLOGICAL  SYMPTOMS        RECORDING THE    DURATION  AND  FREQUENCY. *  AVOID    CONDITIONS  AND  FACTORS   THAT  MAKE                  NEUROLOGICAL  SYMPTOMS  WORSE.                    *TO  MAINTAIN  REGULAR  SLEEP  WAKE  CYCLES. *   TO  HAVE  ADEQUATE  REST  AND   SLEEP    HOURS.          *    TO   AVOID   TO  SLEEP  IN   SUPINE  POSITION. *      WEIGHT   LOSS. *    AVOID  ANY USAGE OF    TOBACCO,          AVOID  EXCESSIVE  ALCOHOL  AND   ILLEGAL   SUBSTANCES          *  CONTINUE   MEDICATIONS    PRESCRIBED       AS    RECOMMENDED       *   Compliance   With  Medications   And  Instructions          * CURRENTLY    TOLERATING  THE  PRESCRIBED   MEDICATIONS. WITHOUT  ANY  SIGNIFICANT  SIDE  EFFECTS   &  GETTING BENEFIT. *    Antiplatelet  therapy    As   Recommended  Was   Discussed      *    Prophylactic  Use   Of     Vitamin   B   Complex,  Folic  Acid,    Vitamin  B12    Multivitamin,       Calcium  With  magnesium  And  Vit D    Supplementations   Over  The  Counter  Discussed                *  EVALUATIONS  AND  FOLLOW UP:                       *CARDIOLOGY                 *   DENTAL                     * ENT                                   *     RECOMMENDED  :                                    A)     TO  CONTINUE   TEGRETOL   400  MG  BID                                B)    ADD   NEURONTIN   300   MG    EVENING  AND  BEDTIME  AS NEEDED                                          FOR  SYMPTOMATIC    RELIEF                                                           VARIOUS  RISK   FACTORS   WERE  REVIEWED   AND   DISCUSSED. Controlled Substances Monitoring: Periodic Controlled Substance Monitoring: Possible medication side effects, risk of tolerance/dependence & alternative treatments discussed. , Assessed functional status. Neelam Ramey MD)            Orders Placed This Encounter   Medications    gabapentin (NEURONTIN) 300 MG capsule     Sig: TAKE   ONE    CAPSULE     IN   THE  EVENING  AND      AT  BED   TIME   AS  NEEDED     Dispense:  60 capsule     Refill:  2                            *PATIENT   TO  FOLLOW  UP  WITH   PRIMARY  CARE         OTHER  CONSULTANTS  AS  BEFORE. *  Maintain   Healthy  Life Style    With   Periodic  Monitoring  Of          Any  Medical  Conditions  Including   Elevated  Blood  Pressure,  Lipid  Profile,        Blood  Sugar levels  AndHeart  Disease. *   Period   Screening  For  Cancers  Involving  Breast,  Colon,         Lungs  And  Other  Organs  As  Applicable,  In consultation   With  Your  Primary Care Providers. *Second  Neurological  Opinion  And  Evaluations  In  [facility]  Setting  If  Patient  Is  Interested. * Please   Contact   Neurology  Clinic   Early   If   Are  Any  New  Neurological   And  Any neurological  Concerns. *  If  The  Patient remains  Neurologically  Stable   Return   To  [facility] Neurology Department   IN      3   MONTHS  TIME   FOR  FURTHER              FOLLOW UP.                       *   The  Neurological   Findings,  Possible  Diagnosis,  Differential diagnoses                    And  Options  For    Further   Investigations                   And  management   Are  Discussed  Comprehensively. Medications   And  Prescription   Risks  And  Side effects  Are   Also  Discussed.                      *  If   There is  Any  Significant  Worsening   Of  Current  Symptoms  And  Or                  If patient  Develops   Any additional New  NeurologicalSymptoms                  Or  Significant  Concerns   Should  Call  911 or  Go  To  Emergency  Department                  For  Further  Immediate  Evaluation and  management . The   Above  Were  Reviewed  With  PATIENT   and                          questions  Answered  In  Detail. Electronically signed by   Mary Arrington M.D., [address]. Board Certified in  Neurology &  In  [facility] ([facility])      DISCLAIMER:   Although every effort was made to ensure the accuracy of this  electronictranscription, some errors in transcription may have occurred. GENERAL PATIENT INSTRUCTIONS:     A Healthy Lifestyle: Care Instructions  Your Care Instructions  A healthy lifestyle can help you feel good, stay at ahealthy weight, and have plenty of energy for both work and play. A healthy lifestyle is something you can share with your whole family. A healthy lifestyle also can lower your risk for serious health problems, such ashigh blood pressure, heart disease, and diabetes. You can follow a few steps listed below to improve your health and the health of your family. Follow-up careis a key part of your treatment and safety. Be sure to make and go to all appointments, and call your doctor if you are having problems. Its also a good idea to know your test results and keep a list of the medicines you take. How can you care for yourself at home? Do not eat too much sugar, fat, or fast foods. You can still have dessert and treats nowand then. The goal is moderation. Start small to improve your eating habits. Pay attention to portion sizes, drink less juice and soda pop, and eat more fruits and vegetables. Eat a healthy amount of food. A 3-ounce serving of meat, for example, is about the size of a deck of cards. Fill the rest of your plate with vegetables and whole grains.   Limit theamount of soda and sports drinks you have every day. Drink more water when you are thirsty. Eat at least 5 servings of fruits and vegetables every day. It may seem like a lot, but it is not hard to reach this goal. Aserving or helping is 1 piece of fruit, 1 cup of vegetables, or 2 cups of leafy, raw vegetables. Have an apple or some carrot sticks as an afternoon snack instead of a candy bar. Try to have fruits and/or vegetables at everymeal.  Make exercise part of your daily routine. You may want to start with simple activities, such as walking, bicycling, or slow swimming. Try shon active 30 to 60 minutes every day. You do not need to do all 30 to 60 minutes all at once. For example, you can exercise 3 times a day for 10 or 20 minutes. Moderate exercise is safe for most people, but it is always agood idea to talk to your doctor before starting an exercise program.  Keep moving. [employer] the lawn, work in the garden, or [employer]. Take the stairs instead of the elevator at work. If you smoke, quit. Peoplewho smoke have an increased risk for heart attack, stroke, cancer, and other lung illnesses. Quitting is hard, but there are ways to boost your chance of quitting tobacco for good. Use nicotine gum, patches, or lozenges. Ask your doctor about stop-smoking programs and medicines. Keep trying. In addition to reducing your risk of diseases in the future, you will notice some benefits soon after you stop using tobacco. If you have shortness of breath or asthma symptoms, they will likely getbetter within a few weeks after you quit. Limit how much alcohol you drink. Moderate amounts of alcohol (up to 2 drinks a day for men, 1drink a day for women) are okay. But drinking too much can lead to liver problems, high blood pressure, and other health problems. health  If you have a family, there are many things you can do together to improve your health. Eat meals together as a family as often as possible.   Eat healthy foods. This includes fruits, vegetables, lean meats and dairy, and whole grains. Include your family in your fitness plan. Most peoplethink of activities such as jogging or tennis as the way to fitness, but there are many ways you and your family can be more active. Anything that makes you breathe hard and gets your heart pumping is exercise. Here are sometips:  Walk to do errands or to take your child to school or the bus. Go for a family bike ride after dinner instead of watching TV. Where can you learn more? Go totps://Jold[employer].compeSheer Driveeb.[employer] org and sign in to your [employer] account. Enter Q722 in the Search HealthInformation box to learn more about \"A Healthy Lifestyle: Care Instructions. \"     If you do not have anaccount, please click on the \"Sign Up Now\" link. Current as of: July 26, 2016  Content Version: 11.2  © 5760-6636 [employer]. Care instructions adapted under license by [facility] ([facility]). If you have questions about a medical condition or this instruction, always ask your healthcare professional. [employer] disclaims any warranty or liability for your use of this information.

## 2023-05-17 ENCOUNTER — E-VISIT (OUTPATIENT)
Dept: PRIMARY CARE CLINIC | Age: 53
End: 2023-05-17
Payer: MEDICAID

## 2023-05-17 DIAGNOSIS — J01.90 ACUTE NON-RECURRENT SINUSITIS, UNSPECIFIED LOCATION: Primary | ICD-10-CM

## 2023-05-17 PROCEDURE — 99422 OL DIG E/M SVC 11-20 MIN: CPT | Performed by: NURSE PRACTITIONER

## 2023-05-17 RX ORDER — FLUCONAZOLE 150 MG/1
150 TABLET ORAL ONCE
Qty: 1 TABLET | Refills: 0 | Status: SHIPPED | OUTPATIENT
Start: 2023-05-17 | End: 2023-05-17

## 2023-05-17 RX ORDER — AMOXICILLIN AND CLAVULANATE POTASSIUM 875; 125 MG/1; MG/1
1 TABLET, FILM COATED ORAL 2 TIMES DAILY
Qty: 20 TABLET | Refills: 0 | Status: SHIPPED | OUTPATIENT
Start: 2023-05-17 | End: 2023-05-27

## 2023-05-17 ASSESSMENT — LIFESTYLE VARIABLES
SMOKING_YEARS: 15
SMOKING_STATUS: NO, I'M A FORMER SMOKER
PACKS_PER_DAY: 1

## 2023-05-17 NOTE — PROGRESS NOTES
Catrina Painter (1970) initiated an asynchronous digital communication through [address] [place] [address]. HPI: per patient questionnaire     Exam: not applicable    Diagnoses and all orders for this visit:  Diagnoses and all orders for this visit:    Acute non-recurrent sinusitis, unspecified location    Other orders  -     amoxicillin-clavulanate (AUGMENTIN) 875-125 MG per tablet; Take 1 tablet by mouth 2 times daily for 10 days  -     fluconazole (DIFLUCAN) 150 MG tablet; Take 1 tablet by mouth once for 1 dose    Supportive care measures provided  Recommend follow-up PCP if symptoms do not improve    Time: EV2 - 11-20 minutes were spent on the digital evaluation and management of this patient.  16 min     DANY Koch - CNP

## 2023-07-13 ENCOUNTER — OFFICE VISIT (OUTPATIENT)
Dept: NEUROLOGY | Age: 53
End: 2023-07-13
Payer: MEDICAID

## 2023-07-13 VITALS
WEIGHT: 219 LBS | RESPIRATION RATE: 16 BRPM | DIASTOLIC BLOOD PRESSURE: 90 MMHG | OXYGEN SATURATION: 98 % | SYSTOLIC BLOOD PRESSURE: 176 MMHG | BODY MASS INDEX: 38.8 KG/M2 | HEIGHT: 63 IN | HEART RATE: 71 BPM

## 2023-07-13 DIAGNOSIS — I35.1 MILD AORTIC REGURGITATION: ICD-10-CM

## 2023-07-13 DIAGNOSIS — Z87.891 EX-SMOKER: ICD-10-CM

## 2023-07-13 DIAGNOSIS — G50.0 TRIGEMINAL NEURALGIA OF LEFT SIDE OF FACE: Primary | ICD-10-CM

## 2023-07-13 DIAGNOSIS — H81.09 MENIERE'S DISEASE, UNSPECIFIED LATERALITY: ICD-10-CM

## 2023-07-13 DIAGNOSIS — E66.9 OBESITY (BMI 30-39.9): ICD-10-CM

## 2023-07-13 PROCEDURE — 99214 OFFICE O/P EST MOD 30 MIN: CPT | Performed by: PSYCHIATRY & NEUROLOGY

## 2023-07-13 PROCEDURE — 99212 OFFICE O/P EST SF 10 MIN: CPT | Performed by: PSYCHIATRY & NEUROLOGY

## 2023-07-13 RX ORDER — GABAPENTIN 300 MG/1
CAPSULE ORAL
Qty: 60 CAPSULE | Refills: 2 | Status: SHIPPED | OUTPATIENT
Start: 2023-07-13 | End: 2023-10-19

## 2023-07-13 RX ORDER — LEVOTHYROXINE SODIUM 0.1 MG/1
TABLET ORAL
COMMUNITY
Start: 2023-07-01

## 2023-07-13 RX ORDER — CARBAMAZEPINE 200 MG/1
CAPSULE, EXTENDED RELEASE ORAL
Qty: 120 CAPSULE | Refills: 5 | Status: SHIPPED | OUTPATIENT
Start: 2023-07-13

## 2023-07-13 ASSESSMENT — ENCOUNTER SYMPTOMS
CHOKING: 0
SINUS PRESSURE: 0
CHEST TIGHTNESS: 0
APNEA: 0
WHEEZING: 0
TROUBLE SWALLOWING: 0
VOICE CHANGE: 0
SHORTNESS OF BREATH: 0
VISUAL CHANGE: 0
FACIAL SWELLING: 0

## 2023-07-13 NOTE — PROGRESS NOTES
[facility]  Neurology    [address], [place]  ODTSJ:[phone]   Fax: [phone]        SUBJECTIVE:         PATIENT ID:  Radha Jacobs is a  RIGHT    HANDED 50 y.o. female. Neurologic Problem  The patient's pertinent negatives include no clumsiness, focal sensory loss, focal weakness, loss of balance, memory loss, near-syncope, slurred speech, syncope, visual change or weakness. Primary symptoms comment: LEFT   TRIGEMINAL  NEURALGIA    IN  MAXILLARY    AREA   SINCE    2016. This is a chronic problem. The neurological problem developed gradually. The problem has been waxing and waning since onset. There was no focality noted. Pertinent negatives include no auditory change, aura or shortness of breath. Treatments tried: TEGRETAL  The treatment provided significant relief. There is no history of a bleeding disorder, a clotting disorder, a CVA, dementia, head trauma, liver disease, mood changes or seizures. History obtained from  The   PATIENT         and other  available   medical  records   were  Also  reviewed. The  Duration,  Quality,  Severity,  Location,  Timing,  Context,  Modifying  Factors   Of   The   Chief   Complaint       And  Present  Illness  Was   Reviewed   In   Chronological   Manner. PATIENT'S  MAIN  CONCERNS INCLUDE :                       1)     H/O   CHRONIC     TRIGEMINAL  NEURALGIA     ON  LEFT     FACE                                            MAXILLARY  DIVISION      SINCE   2016                                      PREVIOUS    H/O    DENTAL    EXTRACTION    IN   2016                                   AND    ALSO   HAD   FOLLOW   UP                                                                2)     NO     H/O   TRAUMA    TO   FACE /  HEAD  AND  NECK  .                               3)     PATIENT      HAD   NEUROLOGY   EVALUATIONS      IN

## 2023-08-22 RX ORDER — CARBAMAZEPINE 200 MG/1
CAPSULE, EXTENDED RELEASE ORAL
Qty: 120 CAPSULE | Refills: 5 | Status: SHIPPED | OUTPATIENT
Start: 2023-08-22

## 2023-08-22 NOTE — TELEPHONE ENCOUNTER
Last Appt:  7/13/2023  Next Appt:   11/1/2023  Med verified in [address] [place] [address]

## 2023-10-10 DIAGNOSIS — G50.0 TRIGEMINAL NEURALGIA OF LEFT SIDE OF FACE: ICD-10-CM

## 2023-10-10 NOTE — TELEPHONE ENCOUNTER
Last Appt:  7/13/2023  Next Appt:   10/19/2023  Med verified in Epic     Patient needs refill on Gabapentin

## 2023-10-11 RX ORDER — GABAPENTIN 300 MG/1
CAPSULE ORAL
Qty: 60 CAPSULE | Refills: 2 | Status: SHIPPED | OUTPATIENT
Start: 2023-10-11 | End: 2024-01-16

## 2023-10-20 ENCOUNTER — E-VISIT (OUTPATIENT)
Dept: PRIMARY CARE CLINIC | Age: 53
End: 2023-10-20
Payer: MEDICAID

## 2023-10-20 DIAGNOSIS — J06.9 VIRAL UPPER RESPIRATORY TRACT INFECTION: Primary | ICD-10-CM

## 2023-10-20 PROCEDURE — 99422 OL DIG E/M SVC 11-20 MIN: CPT | Performed by: NURSE PRACTITIONER

## 2023-10-20 ASSESSMENT — LIFESTYLE VARIABLES
SMOKING_STATUS: NO, I'M A FORMER SMOKER
SMOKING_YEARS: 15
PACKS_PER_DAY: 1

## 2023-10-20 NOTE — PROGRESS NOTES
Bean Farrell (1970) initiated an asynchronous digital communication through [address] [place] [address]. HPI: per patient questionnaire     Exam: not applicable    Diagnoses and all orders for this visit:  Diagnoses and all orders for this visit:    Viral upper respiratory tract infection      Sx started a few days ago. Likely viral or allergy in nature. Recommend supportive care measures for 7-10 days before starting antibiotics. Supportive care suggestions sent. F/u with pcp as needed     Time: EV2 - 11-20 minutes were spent on the digital evaluation and management of this patient.  17 min     Frankey Huddle, DANY - CNP

## 2023-11-01 ENCOUNTER — OFFICE VISIT (OUTPATIENT)
Dept: NEUROLOGY | Age: 53
End: 2023-11-01
Payer: MEDICAID

## 2023-11-01 VITALS
WEIGHT: 215 LBS | BODY MASS INDEX: 38.09 KG/M2 | HEART RATE: 73 BPM | RESPIRATION RATE: 16 BRPM | SYSTOLIC BLOOD PRESSURE: 136 MMHG | OXYGEN SATURATION: 98 % | DIASTOLIC BLOOD PRESSURE: 78 MMHG

## 2023-11-01 DIAGNOSIS — M26.609 TEMPOROMANDIBULAR JOINT DISORDER (TMJ): ICD-10-CM

## 2023-11-01 DIAGNOSIS — F41.9 ANXIETY AND DEPRESSION: ICD-10-CM

## 2023-11-01 DIAGNOSIS — I35.1 MILD AORTIC REGURGITATION: ICD-10-CM

## 2023-11-01 DIAGNOSIS — G50.0 TRIGEMINAL NEURALGIA OF LEFT SIDE OF FACE: Primary | ICD-10-CM

## 2023-11-01 DIAGNOSIS — Z87.891 EX-SMOKER: ICD-10-CM

## 2023-11-01 DIAGNOSIS — H81.09 MENIERE'S DISEASE, UNSPECIFIED LATERALITY: ICD-10-CM

## 2023-11-01 DIAGNOSIS — F32.A ANXIETY AND DEPRESSION: ICD-10-CM

## 2023-11-01 DIAGNOSIS — E66.9 OBESITY (BMI 30-39.9): ICD-10-CM

## 2023-11-01 DIAGNOSIS — J32.8 OTHER CHRONIC SINUSITIS: ICD-10-CM

## 2023-11-01 PROCEDURE — 99214 OFFICE O/P EST MOD 30 MIN: CPT | Performed by: PSYCHIATRY & NEUROLOGY

## 2023-11-01 PROCEDURE — 99213 OFFICE O/P EST LOW 20 MIN: CPT | Performed by: PSYCHIATRY & NEUROLOGY

## 2023-11-01 RX ORDER — DILTIAZEM HYDROCHLORIDE 120 MG/1
120 CAPSULE, COATED, EXTENDED RELEASE ORAL DAILY
Qty: 30 CAPSULE | Refills: 11 | COMMUNITY
Start: 2023-09-26 | End: 2024-09-25

## 2023-11-01 RX ORDER — HYDRALAZINE HYDROCHLORIDE 25 MG/1
25 TABLET, FILM COATED ORAL 3 TIMES DAILY PRN
COMMUNITY
Start: 2023-09-26 | End: 2024-09-25

## 2023-11-01 RX ORDER — LEVOTHYROXINE SODIUM 0.05 MG/1
TABLET ORAL
COMMUNITY
Start: 2023-10-10

## 2023-11-01 RX ORDER — CARBAMAZEPINE 200 MG/1
CAPSULE, EXTENDED RELEASE ORAL
Qty: 120 CAPSULE | Refills: 5 | Status: SHIPPED | OUTPATIENT
Start: 2023-11-01

## 2023-11-01 RX ORDER — CALCIUM CARBONATE/VITAMIN D3 500 MG-10
TABLET,CHEWABLE ORAL
COMMUNITY
Start: 2023-09-11

## 2023-11-01 RX ORDER — GABAPENTIN 300 MG/1
CAPSULE ORAL
Qty: 60 CAPSULE | Refills: 2 | Status: SHIPPED | OUTPATIENT
Start: 2023-11-01 | End: 2024-02-06

## 2023-11-01 RX ORDER — BUPROPION HYDROCHLORIDE 300 MG/1
300 TABLET ORAL DAILY
COMMUNITY
Start: 2023-10-10

## 2023-11-01 ASSESSMENT — PATIENT HEALTH QUESTIONNAIRE - PHQ9
SUM OF ALL RESPONSES TO PHQ QUESTIONS 1-9: 0
2. FEELING DOWN, DEPRESSED OR HOPELESS: 0
SUM OF ALL RESPONSES TO PHQ9 QUESTIONS 1 & 2: 0
1. LITTLE INTEREST OR PLEASURE IN DOING THINGS: 0

## 2023-11-01 ASSESSMENT — ENCOUNTER SYMPTOMS
CHOKING: 0
APNEA: 0
CHEST TIGHTNESS: 0
FACIAL SWELLING: 0
WHEEZING: 0
SHORTNESS OF BREATH: 0
VISUAL CHANGE: 0
TROUBLE SWALLOWING: 0
VOICE CHANGE: 0
SINUS PRESSURE: 0

## 2023-11-01 NOTE — PROGRESS NOTES
[facility]  Neurology    [address], [place]  TJGYY:[phone]   Fax: [phone]        SUBJECTIVE:         PATIENT ID:  James Estrella is a  RIGHT    HANDED 48 y.o. female. Neurologic Problem  The patient's pertinent negatives include no clumsiness, focal sensory loss, focal weakness, loss of balance, memory loss, near-syncope, slurred speech, syncope, visual change or weakness. Primary symptoms comment: LEFT   TRIGEMINAL  NEURALGIA    IN  MAXILLARY    AREA   SINCE    2016. This is a chronic problem. The neurological problem developed gradually. The problem has been waxing and waning since onset. There was no focality noted. Pertinent negatives include no auditory change, aura or shortness of breath. Treatments tried: TEGRETAL  The treatment provided significant relief. There is no history of a bleeding disorder, a clotting disorder, a CVA, dementia, head trauma, liver disease, mood changes or seizures. History obtained from  The   PATIENT         and other  available   medical  records   were  Also  reviewed. The  Duration,  Quality,  Severity,  Location,  Timing,  Context,  Modifying  Factors   Of   The   Chief   Complaint       And  Present  Illness  Was   Reviewed   In   Chronological   [facility]anner. PATIENT'S  MAIN  CONCERNS INCLUDE :                       1)     H/O   CHRONIC     TRIGEMINAL  NEURALGIA     ON  LEFT     FACE                                            MAXILLARY  DIVISION      SINCE   2016                                      PREVIOUS    H/O    DENTAL    EXTRACTION    IN   2016                                   AND    ALSO   HAD   FOLLOW   UP                                                                2)     NO     H/O   TRAUMA    TO   FACE /  HEAD  AND  NECK  .                               3)     PATIENT      HAD   NEUROLOGY   EVALUATIONS      IN

## 2024-01-16 ENCOUNTER — E-VISIT (OUTPATIENT)
Dept: PRIMARY CARE CLINIC | Age: 54
End: 2024-01-16
Payer: MEDICAID

## 2024-01-16 DIAGNOSIS — J06.9 VIRAL UPPER RESPIRATORY TRACT INFECTION: Primary | ICD-10-CM

## 2024-01-16 PROCEDURE — 99422 OL DIG E/M SVC 11-20 MIN: CPT | Performed by: NURSE PRACTITIONER

## 2024-01-16 RX ORDER — METHYLPREDNISOLONE 4 MG/1
TABLET ORAL
Qty: 1 KIT | Refills: 0 | Status: SHIPPED | OUTPATIENT
Start: 2024-01-16 | End: 2024-01-22

## 2024-01-16 ASSESSMENT — LIFESTYLE VARIABLES
SMOKING_YEARS: 15
SMOKING_STATUS: NO, I'M A FORMER SMOKER
PACKS_PER_DAY: 1

## 2024-01-16 NOTE — PROGRESS NOTES
Reviewed questionnaire    Reviewed meds/allergies    Dx Viral illness    Plan Symptoms x 4 days. Rest/fluids. Rx given for medrol dose pack. Follow up with PCP if no improvement    Time spent on visit 11 min

## 2024-02-12 NOTE — TELEPHONE ENCOUNTER
Last Appt:  11/1/2023  Next Appt:   2/28/2024  Med verified in Epic      Patient requesting refill for cabamazepine     [facility]

## 2024-02-13 RX ORDER — CARBAMAZEPINE 200 MG/1
CAPSULE, EXTENDED RELEASE ORAL
Qty: 120 CAPSULE | Refills: 5 | Status: SHIPPED | OUTPATIENT
Start: 2024-02-13

## 2024-04-08 ENCOUNTER — E-VISIT (OUTPATIENT)
Dept: PRIMARY CARE CLINIC | Age: 54
End: 2024-04-08
Payer: MEDICAID

## 2024-04-08 DIAGNOSIS — J06.9 UPPER RESPIRATORY TRACT INFECTION, UNSPECIFIED TYPE: Primary | ICD-10-CM

## 2024-04-08 PROCEDURE — 99422 OL DIG E/M SVC 11-20 MIN: CPT | Performed by: NURSE PRACTITIONER

## 2024-04-08 RX ORDER — AMOXICILLIN AND CLAVULANATE POTASSIUM 875; 125 MG/1; MG/1
1 TABLET, FILM COATED ORAL 2 TIMES DAILY
Qty: 20 TABLET | Refills: 0 | Status: SHIPPED | OUTPATIENT
Start: 2024-04-08 | End: 2024-04-18

## 2024-04-08 ASSESSMENT — LIFESTYLE VARIABLES
SMOKING_YEARS: 15
SMOKING_STATUS: NO, I'M A FORMER SMOKER
PACKS_PER_DAY: 1

## 2024-04-08 NOTE — PROGRESS NOTES
Pascale Costa (1970) initiated an asynchronous digital communication through [employer].    HPI: per patient questionnaire     Exam: not applicable    Diagnoses and all orders for this visit:  Diagnoses and all orders for this visit:    Upper respiratory tract infection, unspecified type    Other orders  -     amoxicillin-clavulanate (AUGMENTIN) 875-125 MG per tablet; Take 1 tablet by mouth 2 times daily for 10 days      Sx for 7 days. Antibiotics sent. Supportive care measures provided. F/u with pcp as needed     Time: EV2 - 11-20 minutes were spent on the digital evaluation and management of this patient. 15 min     DANY Huffman - CNP

## 2024-04-12 DIAGNOSIS — G50.0 TRIGEMINAL NEURALGIA OF LEFT SIDE OF FACE: ICD-10-CM

## 2024-04-12 RX ORDER — GABAPENTIN 300 MG/1
CAPSULE ORAL
Qty: 60 CAPSULE | Refills: 0 | Status: SHIPPED | OUTPATIENT
Start: 2024-04-12 | End: 2024-07-18

## 2024-04-12 NOTE — TELEPHONE ENCOUNTER
Last Appt:  11/1/2023  Next Appt:   5/2/2024  Med verified in Epic     Patient needs refill on gabapentin

## 2024-05-02 ENCOUNTER — OFFICE VISIT (OUTPATIENT)
Dept: NEUROLOGY | Age: 54
End: 2024-05-02
Payer: MEDICAID

## 2024-05-02 VITALS
DIASTOLIC BLOOD PRESSURE: 88 MMHG | WEIGHT: 223 LBS | BODY MASS INDEX: 39.51 KG/M2 | SYSTOLIC BLOOD PRESSURE: 128 MMHG | HEIGHT: 63 IN

## 2024-05-02 DIAGNOSIS — Z87.891 EX-SMOKER: ICD-10-CM

## 2024-05-02 DIAGNOSIS — I35.1 MILD AORTIC REGURGITATION: ICD-10-CM

## 2024-05-02 DIAGNOSIS — F17.200 SMOKER: ICD-10-CM

## 2024-05-02 DIAGNOSIS — M26.609 TEMPOROMANDIBULAR JOINT DISORDER (TMJ): ICD-10-CM

## 2024-05-02 DIAGNOSIS — H81.09 MENIERE'S DISEASE, UNSPECIFIED LATERALITY: ICD-10-CM

## 2024-05-02 DIAGNOSIS — F41.9 ANXIETY AND DEPRESSION: ICD-10-CM

## 2024-05-02 DIAGNOSIS — F32.A ANXIETY AND DEPRESSION: ICD-10-CM

## 2024-05-02 DIAGNOSIS — G50.0 TRIGEMINAL NEURALGIA OF LEFT SIDE OF FACE: Primary | ICD-10-CM

## 2024-05-02 PROCEDURE — 99213 OFFICE O/P EST LOW 20 MIN: CPT | Performed by: PSYCHIATRY & NEUROLOGY

## 2024-05-02 RX ORDER — CITALOPRAM 40 MG/1
40 TABLET ORAL DAILY
COMMUNITY

## 2024-05-02 RX ORDER — CARBAMAZEPINE 200 MG/1
CAPSULE, EXTENDED RELEASE ORAL
Qty: 120 CAPSULE | Refills: 5 | Status: SHIPPED | OUTPATIENT
Start: 2024-05-02

## 2024-05-02 RX ORDER — LORAZEPAM 0.5 MG/1
TABLET ORAL
COMMUNITY

## 2024-05-02 RX ORDER — GABAPENTIN 300 MG/1
CAPSULE ORAL
Qty: 60 CAPSULE | Refills: 2 | Status: SHIPPED | OUTPATIENT
Start: 2024-05-02 | End: 2024-08-07

## 2024-05-02 ASSESSMENT — ENCOUNTER SYMPTOMS
TROUBLE SWALLOWING: 0
CHOKING: 0
FACIAL SWELLING: 0
APNEA: 0
WHEEZING: 0
VISUAL CHANGE: 0
VOICE CHANGE: 0
SHORTNESS OF BREATH: 0
SINUS PRESSURE: 0
CHEST TIGHTNESS: 0

## 2024-05-02 NOTE — PROGRESS NOTES
[facility]         AND     BEING       ON                                       TEGRETOL    XR    400    MG   BID      SINCE     2017                            4)      HAD    MRI   EVALUATIONS     IN    SEPT. 2016                                AND   IN    OCT.  2020       WITH   AND  W/O   CONTRAST                                    SHOWED   NO  SIGNIFICANT   ABNORMALITIES                         5)      PREVIOUS     H/O     HYPER SENSITIVE    AND  BURNING     SENSATION                                    LEFT    LOWER   MAXILLARY   AREA                                      -    WELL   CONTROLLED                                     6)     PREVIOUS    H/O     CHRONIC  SMOKING                        7)       PREVIOUS     H/O   PALPITATIONS,    CARDIAC   MURMUR                               AND  MILD    AORTIC     REGURGITATION                                     -   BEING       FOLLOWED  BY    CARDIOLOGY                           8)     PREVIOUS    H/O     EPISODIC   VERTIGO      AND  MENIERE'S   DISEASE                                    HAD     ENT      EVALUATIONS                                -   WAS        ON      HYDROCHLOROTHIAZIDE                                       -    IMPROVED                                                       9)         H/O    CO MORBID  MEDICAL  CONDITIONS                                  INCLUDING    MILD     OBESITY                                            - BEING  FOLLOWED  BY  HER  PCP                                           10)      PATIENT      HAD    MRI  BRAIN IN  FEB. 2022    SHOWED                                   A)     UN  REMARKABLE    MRI  BRETT   AND  IACs                               B)    Scattered   Paranasal  Sinus  Disease     And                                           Trace  Bilateral   Mastoid  Effusions                              LABS    INCLUDING     TEGRETOL   LEVELS                                      ARE    WITH  IN  NORMAL  LIMITS

## 2024-05-02 NOTE — PATIENT INSTRUCTIONS
*   ADEQUATE   FLUID  INTAKE   AND  ELECTROLYTE  BALANCE             * KEEP  DAIRY  OF   THE  NEUROLOGICAL  SYMPTOMS          *  TO  MAINTAIN  REGULAR  SLEEP  WAKE  CYCLES.     *   TO  HAVE  ADEQUATE  REST  AND   SLEEP    HOURS.          *    AVOID  USAGE OF   TOBACCO,  EXCESSIVE  ALCOHOL                AND   ILLEGAL   SUBSTANCES,  IF  ANY          *  Maintain   Healthy  Life Style    With   Periodic  Monitoring  Of         Any  Medical  Conditions  Including   Elevated  Blood  Pressure,  Lipid  Profile,       Blood  Sugar levels  And   Heart  Disease.                *   Period   Screening  For  Cancers  Involving  Breast,  Colon,         Lungs  And  Other  Organs  As  Applicable,           In consultation   With  Your  Primary Care Providers.                *  If   There is  Any  Significant  Worsening   Of  Current  Symptoms  And             Or  If    Any additional  New  Neurological  Symptoms  and          Significant  Concerns   Should  Call  911 or  Go  To  Emergency  Department            For  Further  Immediate  Evaluation.

## 2024-06-27 ENCOUNTER — E-VISIT (OUTPATIENT)
Dept: PRIMARY CARE CLINIC | Age: 54
End: 2024-06-27
Payer: MEDICAID

## 2024-06-27 DIAGNOSIS — R30.0 DYSURIA: Primary | ICD-10-CM

## 2024-06-27 PROCEDURE — 99422 OL DIG E/M SVC 11-20 MIN: CPT | Performed by: NURSE PRACTITIONER

## 2024-06-27 RX ORDER — SULFAMETHOXAZOLE AND TRIMETHOPRIM 800; 160 MG/1; MG/1
1 TABLET ORAL 2 TIMES DAILY
Qty: 10 TABLET | Refills: 0 | Status: SHIPPED | OUTPATIENT
Start: 2024-06-27 | End: 2024-07-02

## 2024-06-27 NOTE — PROGRESS NOTES
Pascale Costa (1970) initiated an asynchronous digital communication through [employer].    HPI: per patient questionnaire     Exam: not applicable    Diagnoses and all orders for this visit:  Diagnoses and all orders for this visit:    Dysuria  -     sulfamethoxazole-trimethoprim (BACTRIM DS;SEPTRA DS) 800-160 MG per tablet; Take 1 tablet by mouth 2 times daily for 5 days    Antibiotic sent.  Supportive care.  Follow-up with PCP as needed    Time: EV2 - 11-20 minutes were spent on the digital evaluation and management of this patient. 11 min     DANY Huffman - CNP

## 2024-10-23 ENCOUNTER — OFFICE VISIT (OUTPATIENT)
Dept: NEUROLOGY | Age: 54
End: 2024-10-23
Payer: MEDICAID

## 2024-10-23 VITALS
BODY MASS INDEX: 38.79 KG/M2 | RESPIRATION RATE: 16 BRPM | DIASTOLIC BLOOD PRESSURE: 80 MMHG | HEART RATE: 66 BPM | WEIGHT: 219 LBS | SYSTOLIC BLOOD PRESSURE: 128 MMHG | OXYGEN SATURATION: 97 %

## 2024-10-23 DIAGNOSIS — H81.09 MENIERE'S DISEASE, UNSPECIFIED LATERALITY: ICD-10-CM

## 2024-10-23 DIAGNOSIS — M26.609 TEMPOROMANDIBULAR JOINT DISORDER (TMJ): ICD-10-CM

## 2024-10-23 DIAGNOSIS — F32.A ANXIETY AND DEPRESSION: ICD-10-CM

## 2024-10-23 DIAGNOSIS — Z87.891 EX-SMOKER: ICD-10-CM

## 2024-10-23 DIAGNOSIS — F41.9 ANXIETY AND DEPRESSION: ICD-10-CM

## 2024-10-23 DIAGNOSIS — G50.0 TRIGEMINAL NEURALGIA OF LEFT SIDE OF FACE: Primary | ICD-10-CM

## 2024-10-23 DIAGNOSIS — F17.200 SMOKER: ICD-10-CM

## 2024-10-23 PROCEDURE — 99214 OFFICE O/P EST MOD 30 MIN: CPT | Performed by: PSYCHIATRY & NEUROLOGY

## 2024-10-23 RX ORDER — CARBAMAZEPINE 200 MG/1
CAPSULE, EXTENDED RELEASE ORAL
Qty: 180 CAPSULE | Refills: 5 | Status: SHIPPED | OUTPATIENT
Start: 2024-10-23

## 2024-10-23 RX ORDER — SERTRALINE HYDROCHLORIDE 100 MG/1
100 TABLET, FILM COATED ORAL DAILY
COMMUNITY
Start: 2024-07-23

## 2024-10-23 RX ORDER — DILTIAZEM HYDROCHLORIDE 360 MG/1
360 CAPSULE, EXTENDED RELEASE ORAL DAILY
COMMUNITY
Start: 2024-08-05

## 2024-10-23 RX ORDER — CYCLOBENZAPRINE HCL 5 MG
TABLET ORAL
COMMUNITY
Start: 2024-10-01

## 2024-10-23 RX ORDER — LEVOTHYROXINE SODIUM 150 UG/1
150 TABLET ORAL DAILY
COMMUNITY
Start: 2024-09-12

## 2024-10-23 RX ORDER — GABAPENTIN 300 MG/1
CAPSULE ORAL
Qty: 60 CAPSULE | Refills: 2 | Status: SHIPPED | OUTPATIENT
Start: 2024-10-23 | End: 2025-01-28

## 2024-10-23 RX ORDER — DULAGLUTIDE 0.75 MG/.5ML
0.75 INJECTION, SOLUTION SUBCUTANEOUS WEEKLY
COMMUNITY
Start: 2024-10-08

## 2024-10-23 RX ORDER — LOSARTAN POTASSIUM 50 MG/1
50 TABLET ORAL DAILY
COMMUNITY
Start: 2024-10-08

## 2024-10-23 ASSESSMENT — ENCOUNTER SYMPTOMS
CHOKING: 0
CHEST TIGHTNESS: 0
VISUAL CHANGE: 0
WHEEZING: 0
SINUS PRESSURE: 0
SHORTNESS OF BREATH: 0
VOICE CHANGE: 0
TROUBLE SWALLOWING: 0
FACIAL SWELLING: 0
APNEA: 0

## 2024-10-23 ASSESSMENT — PATIENT HEALTH QUESTIONNAIRE - PHQ9
SUM OF ALL RESPONSES TO PHQ QUESTIONS 1-9: 0
SUM OF ALL RESPONSES TO PHQ QUESTIONS 1-9: 0
1. LITTLE INTEREST OR PLEASURE IN DOING THINGS: NOT AT ALL
SUM OF ALL RESPONSES TO PHQ QUESTIONS 1-9: 0
SUM OF ALL RESPONSES TO PHQ QUESTIONS 1-9: 0
SUM OF ALL RESPONSES TO PHQ9 QUESTIONS 1 & 2: 0
2. FEELING DOWN, DEPRESSED OR HOPELESS: NOT AT ALL

## 2024-10-23 NOTE — PROGRESS NOTES
facial  Weakness.                          HYPERSENSITIVITY   ON  LEFT  SIDE  OF  FACE    IN  MAXILLARY  AREA           8 CN :  Hearing  Appears within normal limits          9, 10 CN: Normal   spontaneous, reflex   palate   movements         11 CN:   Normal  Shoulder  shrug and  strength         12 CN :   Normal  Tongue movements and  Tongue  In midline                        No tongue   Fasciculations or atrophy       C) MOTOR  EXAM:                 Strength  In upper  And  Lower   extremities   within   normal limits               No  Drift.     No  Atrophy               Rapid   alternating  And  repetitions  Movements  within   normal limits                 Muscle  Tone  In upper  And  Lower  Extremities  normal                No rigidity.  No  Spasticity.                 Bradykinesia   absent                 No  Asterixis.              Sustention  Tremor , Resting   Tremor   absent                    No   other  Abnormal  Movements noted           D) SENSORY :               Light   touch, pinprick,   position  And  Vibration  within normal limits        E) REFLEXES:                   Deep  Tendon  Reflexes   normal                  No  pathological  Reflexes  Bilaterally.                                  F) COORDINATION  AND  GAIT :                                Station and  Gait  normal                              Romberg 's test negative                          Ataxia negative          ASSESSMENT:        Patient Active Problem List   Diagnosis    Murmur, cardiac    Mild aortic regurgitation    Palpitations    Meniere's disease    Temporomandibular joint disorder (TMJ)    Trigeminal neuralgia of left side of face    Smoker    Obesity (BMI 30-39.9)    Other chronic sinusitis    Ex-smoker    Anxiety and depression             MRI OF THE BRAIN WITHOUT AND WITH CONTRAST  2/1/2022 1:08 pm       TECHNIQUE:   Multiplanar multisequence MRI of the head/brain was performed without and   with the administration of

## 2024-11-15 ENCOUNTER — E-VISIT (OUTPATIENT)
Dept: PRIMARY CARE CLINIC | Age: 54
End: 2024-11-15
Payer: MEDICAID

## 2024-11-15 DIAGNOSIS — J06.9 VIRAL UPPER RESPIRATORY TRACT INFECTION: Primary | ICD-10-CM

## 2024-11-15 PROCEDURE — 99422 OL DIG E/M SVC 11-20 MIN: CPT | Performed by: NURSE PRACTITIONER

## 2024-11-15 RX ORDER — METHYLPREDNISOLONE 4 MG/1
TABLET ORAL
Qty: 1 KIT | Refills: 0 | Status: SHIPPED | OUTPATIENT
Start: 2024-11-15 | End: 2024-11-21

## 2024-11-15 ASSESSMENT — LIFESTYLE VARIABLES
PACKS_PER_DAY: 1
SMOKING_STATUS: NO, I'M A FORMER SMOKER
SMOKING_YEARS: 15

## 2025-04-23 ENCOUNTER — OFFICE VISIT (OUTPATIENT)
Dept: NEUROLOGY | Age: 55
End: 2025-04-23
Payer: MEDICAID

## 2025-04-23 VITALS
DIASTOLIC BLOOD PRESSURE: 78 MMHG | SYSTOLIC BLOOD PRESSURE: 130 MMHG | WEIGHT: 190 LBS | HEART RATE: 88 BPM | RESPIRATION RATE: 16 BRPM | OXYGEN SATURATION: 96 % | BODY MASS INDEX: 33.66 KG/M2

## 2025-04-23 DIAGNOSIS — M26.609 TEMPOROMANDIBULAR JOINT DISORDER (TMJ): ICD-10-CM

## 2025-04-23 DIAGNOSIS — F17.200 SMOKER: ICD-10-CM

## 2025-04-23 DIAGNOSIS — H81.09 MENIERE'S DISEASE, UNSPECIFIED LATERALITY: ICD-10-CM

## 2025-04-23 DIAGNOSIS — Z87.891 EX-SMOKER: ICD-10-CM

## 2025-04-23 DIAGNOSIS — F32.A ANXIETY AND DEPRESSION: ICD-10-CM

## 2025-04-23 DIAGNOSIS — F41.9 ANXIETY AND DEPRESSION: ICD-10-CM

## 2025-04-23 DIAGNOSIS — G50.0 TRIGEMINAL NEURALGIA OF LEFT SIDE OF FACE: Primary | ICD-10-CM

## 2025-04-23 PROCEDURE — 99214 OFFICE O/P EST MOD 30 MIN: CPT | Performed by: PSYCHIATRY & NEUROLOGY

## 2025-04-23 RX ORDER — DULAGLUTIDE 3 MG/.5ML
INJECTION, SOLUTION SUBCUTANEOUS
COMMUNITY
Start: 2025-04-02

## 2025-04-23 RX ORDER — CARBAMAZEPINE 200 MG/1
CAPSULE, EXTENDED RELEASE ORAL
Qty: 180 CAPSULE | Refills: 5 | Status: SHIPPED | OUTPATIENT
Start: 2025-04-23

## 2025-04-23 RX ORDER — HYDROCHLOROTHIAZIDE 12.5 MG/1
12.5 TABLET ORAL EVERY MORNING
COMMUNITY
Start: 2025-04-07

## 2025-04-23 RX ORDER — GABAPENTIN 300 MG/1
CAPSULE ORAL
Qty: 60 CAPSULE | Refills: 3 | Status: SHIPPED | OUTPATIENT
Start: 2025-04-23 | End: 2025-07-29

## 2025-04-23 ASSESSMENT — ENCOUNTER SYMPTOMS
SINUS PRESSURE: 0
CHOKING: 0
WHEEZING: 0
VOICE CHANGE: 0
FACIAL SWELLING: 0
SHORTNESS OF BREATH: 0
VISUAL CHANGE: 0
APNEA: 0
CHEST TIGHTNESS: 0
TROUBLE SWALLOWING: 0